# Patient Record
Sex: FEMALE | Race: BLACK OR AFRICAN AMERICAN | NOT HISPANIC OR LATINO | Employment: UNEMPLOYED | URBAN - METROPOLITAN AREA
[De-identification: names, ages, dates, MRNs, and addresses within clinical notes are randomized per-mention and may not be internally consistent; named-entity substitution may affect disease eponyms.]

---

## 2015-10-01 LAB
PAP RECOMMENDATION EXT: NORMAL
PAP SMEAR: NORMAL

## 2016-08-19 LAB — CRC RECOMMENDATION EXT: NORMAL

## 2017-02-14 ENCOUNTER — HISTORICAL (OUTPATIENT)
Dept: HEMATOLOGY/ONCOLOGY | Facility: CLINIC | Age: 52
End: 2017-02-14

## 2017-05-31 ENCOUNTER — HISTORICAL (OUTPATIENT)
Dept: INTERNAL MEDICINE | Facility: CLINIC | Age: 52
End: 2017-05-31

## 2017-05-31 LAB
ALBUMIN SERPL-MCNC: 4.3 GM/DL (ref 3.4–5)
ALBUMIN/GLOB SERPL: 1 RATIO (ref 1–2)
ALP SERPL-CCNC: 52 UNIT/L (ref 20–120)
ALT SERPL-CCNC: 17 UNIT/L
AST SERPL-CCNC: 19 UNIT/L
BILIRUB SERPL-MCNC: 0.4 MG/DL
BILIRUBIN DIRECT+TOT PNL SERPL-MCNC: <0.1 MG/DL
BILIRUBIN DIRECT+TOT PNL SERPL-MCNC: >0.3 MG/DL
BUN SERPL-MCNC: 19 MG/DL (ref 7–25)
CALCIUM SERPL-MCNC: 9.7 MG/DL (ref 8.4–10.3)
CHLORIDE SERPL-SCNC: 102 MMOL/L (ref 96–110)
CHOLEST SERPL-MCNC: 185 MG/DL
CHOLEST/HDLC SERPL: 3.4 {RATIO} (ref 0–4.4)
CO2 SERPL-SCNC: 29 MMOL/L (ref 24–32)
CREAT SERPL-MCNC: 0.78 MG/DL (ref 0.7–1.1)
DEPRECATED CALCIDIOL+CALCIFEROL SERPL-MC: 15.04 NG/ML (ref 30–80)
EST. AVERAGE GLUCOSE BLD GHB EST-MCNC: 114 MG/DL
GLOBULIN SER-MCNC: 3.5 GM/ML (ref 2.3–3.5)
GLUCOSE SERPL-MCNC: 111 MG/DL (ref 65–99)
HBA1C MFR BLD: 5.6 % (ref 4.7–5.6)
HDLC SERPL-MCNC: 54 MG/DL
HIV 1+2 AB+HIV1 P24 AG SERPL QL IA: NONREACTIVE
LDLC SERPL CALC-MCNC: 104 MG/DL (ref 0–130)
POTASSIUM SERPL-SCNC: 4.5 MMOL/L (ref 3.6–5.2)
PROT SERPL-MCNC: 7.8 GM/DL (ref 6–8)
SODIUM SERPL-SCNC: 140 MMOL/L (ref 135–146)
TRIGL SERPL-MCNC: 136 MG/DL
VLDLC SERPL CALC-MCNC: 27 MG/DL

## 2017-07-20 ENCOUNTER — HISTORICAL (OUTPATIENT)
Dept: ADMINISTRATIVE | Facility: HOSPITAL | Age: 52
End: 2017-07-20

## 2017-07-20 LAB
ABS NEUT (OLG): 2.89 X10(3)/MCL (ref 2.1–9.2)
ALBUMIN SERPL-MCNC: 3.8 GM/DL (ref 3.4–5)
ALBUMIN/GLOB SERPL: 1 RATIO (ref 1–2)
ALP SERPL-CCNC: 71 UNIT/L (ref 45–117)
ALT SERPL-CCNC: 24 UNIT/L (ref 12–78)
AST SERPL-CCNC: 21 UNIT/L (ref 15–37)
BASOPHILS # BLD AUTO: 0.04 X10(3)/MCL
BASOPHILS NFR BLD AUTO: 1 % (ref 0–1)
BILIRUB SERPL-MCNC: 0.3 MG/DL (ref 0.2–1)
BILIRUBIN DIRECT+TOT PNL SERPL-MCNC: <0.1 MG/DL
BILIRUBIN DIRECT+TOT PNL SERPL-MCNC: >0.2 MG/DL
BUN SERPL-MCNC: 15 MG/DL (ref 7–18)
CALCIUM SERPL-MCNC: 9.4 MG/DL (ref 8.5–10.1)
CHLORIDE SERPL-SCNC: 103 MMOL/L (ref 98–107)
CO2 SERPL-SCNC: 31 MMOL/L (ref 21–32)
CREAT SERPL-MCNC: 0.9 MG/DL (ref 0.6–1.3)
EOSINOPHIL # BLD AUTO: 0.38 X10(3)/MCL
EOSINOPHIL NFR BLD AUTO: 7 % (ref 0–5)
ERYTHROCYTE [DISTWIDTH] IN BLOOD BY AUTOMATED COUNT: 14.5 % (ref 11.5–14.5)
GLOBULIN SER-MCNC: 4.3 GM/ML (ref 2.3–3.5)
GLUCOSE SERPL-MCNC: 95 MG/DL (ref 74–106)
HCT VFR BLD AUTO: 35.5 % (ref 35–46)
HGB BLD-MCNC: 11.8 GM/DL (ref 12–16)
IMM GRANULOCYTES # BLD AUTO: 0.03 10*3/UL
IMM GRANULOCYTES NFR BLD AUTO: 0 %
LYMPHOCYTES # BLD AUTO: 1.74 X10(3)/MCL
LYMPHOCYTES NFR BLD AUTO: 31 % (ref 15–40)
MCH RBC QN AUTO: 28.2 PG (ref 26–34)
MCHC RBC AUTO-ENTMCNC: 33.2 GM/DL (ref 31–37)
MCV RBC AUTO: 84.7 FL (ref 80–100)
MONOCYTES # BLD AUTO: 0.5 X10(3)/MCL
MONOCYTES NFR BLD AUTO: 9 % (ref 4–12)
NEUTROPHILS # BLD AUTO: 2.89 X10(3)/MCL
NEUTROPHILS NFR BLD AUTO: 52 X10(3)/MCL
PLATELET # BLD AUTO: 323 X10(3)/MCL (ref 130–400)
PMV BLD AUTO: 9.4 FL (ref 7.4–10.4)
POTASSIUM SERPL-SCNC: 3.9 MMOL/L (ref 3.5–5.1)
PROT SERPL-MCNC: 8.1 GM/DL (ref 6.4–8.2)
RBC # BLD AUTO: 4.19 X10(6)/MCL (ref 4–5.2)
SODIUM SERPL-SCNC: 142 MMOL/L (ref 136–145)
WBC # SPEC AUTO: 5.6 X10(3)/MCL (ref 4.5–11)

## 2017-08-24 ENCOUNTER — HISTORICAL (OUTPATIENT)
Dept: HEMATOLOGY/ONCOLOGY | Facility: CLINIC | Age: 52
End: 2017-08-24

## 2017-11-20 ENCOUNTER — HISTORICAL (OUTPATIENT)
Dept: ADMINISTRATIVE | Facility: HOSPITAL | Age: 52
End: 2017-11-20

## 2017-11-20 LAB — FSH SERPL-ACNC: 101.7 MIU/ML

## 2018-04-06 ENCOUNTER — HISTORICAL (OUTPATIENT)
Dept: ADMINISTRATIVE | Facility: HOSPITAL | Age: 53
End: 2018-04-06

## 2018-04-06 LAB
ABS NEUT (OLG): 1.67 X10(3)/MCL (ref 2.1–9.2)
ALBUMIN SERPL-MCNC: 4.1 GM/DL (ref 3.4–5)
ALBUMIN/GLOB SERPL: 1 RATIO (ref 1–2)
ALP SERPL-CCNC: 74 UNIT/L (ref 45–117)
ALT SERPL-CCNC: 15 UNIT/L (ref 12–78)
AST SERPL-CCNC: 12 UNIT/L (ref 15–37)
BASOPHILS # BLD AUTO: 0.06 X10(3)/MCL
BASOPHILS NFR BLD AUTO: 2 %
BILIRUB SERPL-MCNC: 0.3 MG/DL (ref 0.2–1)
BILIRUBIN DIRECT+TOT PNL SERPL-MCNC: <0.1 MG/DL
BILIRUBIN DIRECT+TOT PNL SERPL-MCNC: ABNORMAL MG/DL
BUN SERPL-MCNC: 23 MG/DL (ref 7–18)
CALCIUM SERPL-MCNC: 9.4 MG/DL (ref 8.5–10.1)
CHLORIDE SERPL-SCNC: 107 MMOL/L (ref 98–107)
CO2 SERPL-SCNC: 31 MMOL/L (ref 21–32)
CREAT SERPL-MCNC: 0.8 MG/DL (ref 0.6–1.3)
EOSINOPHIL # BLD AUTO: 0.3 X10(3)/MCL
EOSINOPHIL NFR BLD AUTO: 8 %
ERYTHROCYTE [DISTWIDTH] IN BLOOD BY AUTOMATED COUNT: 14.1 % (ref 11.5–14.5)
GLOBULIN SER-MCNC: 4.3 GM/ML (ref 2.3–3.5)
GLUCOSE SERPL-MCNC: 133 MG/DL (ref 74–106)
HCT VFR BLD AUTO: 38.5 % (ref 35–46)
HGB BLD-MCNC: 12.4 GM/DL (ref 12–16)
IMM GRANULOCYTES # BLD AUTO: 0.02 10*3/UL
IMM GRANULOCYTES NFR BLD AUTO: 0 %
LYMPHOCYTES # BLD AUTO: 1.63 X10(3)/MCL
LYMPHOCYTES NFR BLD AUTO: 41 % (ref 13–40)
MCH RBC QN AUTO: 28.1 PG (ref 26–34)
MCHC RBC AUTO-ENTMCNC: 32.2 GM/DL (ref 31–37)
MCV RBC AUTO: 87.1 FL (ref 80–100)
MONOCYTES # BLD AUTO: 0.3 X10(3)/MCL
MONOCYTES NFR BLD AUTO: 8 % (ref 4–12)
NEUTROPHILS # BLD AUTO: 1.67 X10(3)/MCL
NEUTROPHILS NFR BLD AUTO: 42 X10(3)/MCL
PLATELET # BLD AUTO: 321 X10(3)/MCL (ref 130–400)
PMV BLD AUTO: 9.8 FL (ref 7.4–10.4)
POTASSIUM SERPL-SCNC: 3.8 MMOL/L (ref 3.5–5.1)
PROT SERPL-MCNC: 8.4 GM/DL (ref 6.4–8.2)
RBC # BLD AUTO: 4.42 X10(6)/MCL (ref 4–5.2)
SODIUM SERPL-SCNC: 143 MMOL/L (ref 136–145)
WBC # SPEC AUTO: 4 X10(3)/MCL (ref 4.5–11)

## 2018-08-27 ENCOUNTER — HISTORICAL (OUTPATIENT)
Dept: RADIOLOGY | Facility: HOSPITAL | Age: 53
End: 2018-08-27

## 2019-03-14 ENCOUNTER — HISTORICAL (OUTPATIENT)
Dept: HEMATOLOGY/ONCOLOGY | Facility: CLINIC | Age: 54
End: 2019-03-14

## 2019-04-18 ENCOUNTER — HISTORICAL (OUTPATIENT)
Dept: INTERNAL MEDICINE | Facility: CLINIC | Age: 54
End: 2019-04-18

## 2019-04-18 LAB
ABS NEUT (OLG): 2.66 X10(3)/MCL (ref 2.1–9.2)
ALBUMIN SERPL-MCNC: 4.1 GM/DL (ref 3.4–5)
ALBUMIN/GLOB SERPL: 1 RATIO (ref 1.1–2)
ALP SERPL-CCNC: 72 UNIT/L (ref 45–117)
ALT SERPL-CCNC: 18 UNIT/L (ref 12–78)
AST SERPL-CCNC: 12 UNIT/L (ref 15–37)
BASOPHILS # BLD AUTO: 0.04 X10(3)/MCL
BASOPHILS NFR BLD AUTO: 1 %
BILIRUB SERPL-MCNC: 0.2 MG/DL (ref 0.2–1)
BILIRUBIN DIRECT+TOT PNL SERPL-MCNC: <0.1 MG/DL
BILIRUBIN DIRECT+TOT PNL SERPL-MCNC: >0.1 MG/DL
BUN SERPL-MCNC: 22 MG/DL (ref 7–18)
CALCIUM SERPL-MCNC: 9.9 MG/DL (ref 8.5–10.1)
CHLORIDE SERPL-SCNC: 105 MMOL/L (ref 98–107)
CHOLEST SERPL-MCNC: 192 MG/DL
CHOLEST/HDLC SERPL: 2.7 {RATIO} (ref 0–4.4)
CO2 SERPL-SCNC: 32 MMOL/L (ref 21–32)
CREAT SERPL-MCNC: 0.8 MG/DL (ref 0.6–1.3)
EOSINOPHIL # BLD AUTO: 0.27 X10(3)/MCL
EOSINOPHIL NFR BLD AUTO: 6 %
ERYTHROCYTE [DISTWIDTH] IN BLOOD BY AUTOMATED COUNT: 14 % (ref 11.5–14.5)
EST. AVERAGE GLUCOSE BLD GHB EST-MCNC: 120 MG/DL
GLOBULIN SER-MCNC: 4.2 GM/ML (ref 2.3–3.5)
GLUCOSE SERPL-MCNC: 101 MG/DL (ref 74–106)
HBA1C MFR BLD: 5.8 % (ref 4.2–6.3)
HCT VFR BLD AUTO: 38.8 % (ref 35–46)
HDLC SERPL-MCNC: 72 MG/DL
HGB BLD-MCNC: 12.8 GM/DL (ref 12–16)
IMM GRANULOCYTES # BLD AUTO: 0.02 10*3/UL
IMM GRANULOCYTES NFR BLD AUTO: 0 %
LDLC SERPL CALC-MCNC: 108 MG/DL (ref 0–130)
LYMPHOCYTES # BLD AUTO: 1.47 X10(3)/MCL
LYMPHOCYTES NFR BLD AUTO: 30 % (ref 13–40)
MCH RBC QN AUTO: 28.9 PG (ref 26–34)
MCHC RBC AUTO-ENTMCNC: 33 GM/DL (ref 31–37)
MCV RBC AUTO: 87.6 FL (ref 80–100)
MONOCYTES # BLD AUTO: 0.39 X10(3)/MCL
MONOCYTES NFR BLD AUTO: 8 % (ref 4–12)
NEUTROPHILS # BLD AUTO: 2.66 X10(3)/MCL
NEUTROPHILS NFR BLD AUTO: 55 X10(3)/MCL
PLATELET # BLD AUTO: 331 X10(3)/MCL (ref 130–400)
PMV BLD AUTO: 9.4 FL (ref 7.4–10.4)
POTASSIUM SERPL-SCNC: 4.2 MMOL/L (ref 3.5–5.1)
PROT SERPL-MCNC: 8.3 GM/DL (ref 6.4–8.2)
RBC # BLD AUTO: 4.43 X10(6)/MCL (ref 4–5.2)
SODIUM SERPL-SCNC: 140 MMOL/L (ref 136–145)
TRIGL SERPL-MCNC: 62 MG/DL
VLDLC SERPL CALC-MCNC: 12 MG/DL
WBC # SPEC AUTO: 4.8 X10(3)/MCL (ref 4.5–11)

## 2019-10-14 ENCOUNTER — HISTORICAL (OUTPATIENT)
Dept: RADIOLOGY | Facility: HOSPITAL | Age: 54
End: 2019-10-14

## 2021-04-13 ENCOUNTER — HISTORICAL (OUTPATIENT)
Dept: RADIOLOGY | Facility: HOSPITAL | Age: 56
End: 2021-04-13

## 2021-04-20 ENCOUNTER — HISTORICAL (OUTPATIENT)
Dept: RADIOLOGY | Facility: HOSPITAL | Age: 56
End: 2021-04-20

## 2021-06-16 ENCOUNTER — HISTORICAL (OUTPATIENT)
Dept: INTERNAL MEDICINE | Facility: CLINIC | Age: 56
End: 2021-06-16

## 2021-06-16 LAB
ABS NEUT (OLG): 2.05 X10(3)/MCL (ref 2.1–9.2)
ALBUMIN SERPL-MCNC: 4.4 GM/DL (ref 3.5–5)
ALBUMIN/GLOB SERPL: 1.3 RATIO (ref 1.1–2)
ALP SERPL-CCNC: 75 UNIT/L (ref 40–150)
ALT SERPL-CCNC: 19 UNIT/L (ref 0–55)
AST SERPL-CCNC: 22 UNIT/L (ref 5–34)
BASOPHILS # BLD AUTO: 0 X10(3)/MCL (ref 0–0.2)
BASOPHILS NFR BLD AUTO: 1 %
BILIRUB SERPL-MCNC: 0.3 MG/DL
BILIRUBIN DIRECT+TOT PNL SERPL-MCNC: 0.1 MG/DL (ref 0–0.5)
BILIRUBIN DIRECT+TOT PNL SERPL-MCNC: 0.2 MG/DL (ref 0–0.8)
BUN SERPL-MCNC: 25.2 MG/DL (ref 9.8–20.1)
CALCIUM SERPL-MCNC: 10 MG/DL (ref 8.4–10.2)
CHLORIDE SERPL-SCNC: 107 MMOL/L (ref 98–107)
CHOLEST SERPL-MCNC: 193 MG/DL
CHOLEST/HDLC SERPL: 4 {RATIO} (ref 0–5)
CO2 SERPL-SCNC: 31 MMOL/L (ref 22–29)
CREAT SERPL-MCNC: 0.81 MG/DL (ref 0.55–1.02)
DEPRECATED CALCIDIOL+CALCIFEROL SERPL-MC: 34.8 NG/ML (ref 30–80)
EOSINOPHIL # BLD AUTO: 0.3 X10(3)/MCL (ref 0–0.9)
EOSINOPHIL NFR BLD AUTO: 6 %
ERYTHROCYTE [DISTWIDTH] IN BLOOD BY AUTOMATED COUNT: 14.4 % (ref 11.5–14.5)
EST. AVERAGE GLUCOSE BLD GHB EST-MCNC: 116.9 MG/DL
GLOBULIN SER-MCNC: 3.5 GM/DL (ref 2.4–3.5)
GLUCOSE SERPL-MCNC: 97 MG/DL (ref 74–100)
HBA1C MFR BLD: 5.7 %
HCT VFR BLD AUTO: 38.9 % (ref 35–46)
HDLC SERPL-MCNC: 55 MG/DL (ref 35–60)
HGB BLD-MCNC: 12.4 GM/DL (ref 12–16)
IMM GRANULOCYTES # BLD AUTO: 0.01 10*3/UL
IMM GRANULOCYTES NFR BLD AUTO: 0 %
LDLC SERPL CALC-MCNC: 121 MG/DL (ref 50–140)
LYMPHOCYTES # BLD AUTO: 1.7 X10(3)/MCL (ref 0.6–4.6)
LYMPHOCYTES NFR BLD AUTO: 38 %
MCH RBC QN AUTO: 28.3 PG (ref 26–34)
MCHC RBC AUTO-ENTMCNC: 31.9 GM/DL (ref 31–37)
MCV RBC AUTO: 88.8 FL (ref 80–100)
MONOCYTES # BLD AUTO: 0.4 X10(3)/MCL (ref 0.1–1.3)
MONOCYTES NFR BLD AUTO: 8 %
NEUTROPHILS # BLD AUTO: 2.05 X10(3)/MCL (ref 2.1–9.2)
NEUTROPHILS NFR BLD AUTO: 46 %
NRBC BLD AUTO-RTO: 0 % (ref 0–0.2)
PLATELET # BLD AUTO: 327 X10(3)/MCL (ref 130–400)
PMV BLD AUTO: 9.7 FL (ref 7.4–10.4)
POTASSIUM SERPL-SCNC: 4.6 MMOL/L (ref 3.5–5.1)
PROT SERPL-MCNC: 7.9 GM/DL (ref 6.4–8.3)
RBC # BLD AUTO: 4.38 X10(6)/MCL (ref 4–5.2)
SODIUM SERPL-SCNC: 145 MMOL/L (ref 136–145)
TRIGL SERPL-MCNC: 84 MG/DL (ref 37–140)
VLDLC SERPL CALC-MCNC: 17 MG/DL
WBC # SPEC AUTO: 4.5 X10(3)/MCL (ref 4.5–11)

## 2022-04-10 ENCOUNTER — HISTORICAL (OUTPATIENT)
Dept: ADMINISTRATIVE | Facility: HOSPITAL | Age: 57
End: 2022-04-10
Payer: MEDICAID

## 2022-04-22 ENCOUNTER — HISTORICAL (OUTPATIENT)
Dept: ADMINISTRATIVE | Facility: HOSPITAL | Age: 57
End: 2022-04-22
Payer: MEDICAID

## 2022-04-22 ENCOUNTER — HISTORICAL (OUTPATIENT)
Dept: RADIOLOGY | Facility: HOSPITAL | Age: 57
End: 2022-04-22
Payer: MEDICAID

## 2022-04-26 VITALS
WEIGHT: 158.94 LBS | HEIGHT: 64 IN | DIASTOLIC BLOOD PRESSURE: 88 MMHG | BODY MASS INDEX: 27.13 KG/M2 | SYSTOLIC BLOOD PRESSURE: 138 MMHG

## 2022-05-02 NOTE — PROGRESS NOTES
"[facility] INTERNAL MEDICINE  OUTPATIENT OFFICE VISIT NOTE    SUBJECTIVE:    HPI: Lucille Pabon is a 56 y.o. yo female w/ PMH of HTN, and left breast carcinoma (diagnosed 1/20/2016) s/p mastectomy, who presents today for regular F/U. She states having mild right knee pain at this time with a pain of 3 out of 10; it is aggravated by physical activities and relieved by rest; she doesn't want to take any pain medicine at this time. Other than the mild right knee pain, she feels well overall. Denies chest pain, palpitations, SOB, fever, night sweats, chills, diarrhea, constipation.    ROS:  10 point review of systems assessed and are negative except as stated above    OBJECTIVE:    Vitals:   BP (!) 149/87   Pulse 79   Temp 98.1 °F (36.7 °C) (Oral)   Resp 20   Ht 5' 4" (1.626 m)   Wt 73.5 kg (162 lb 0.6 oz)   BMI 27.81 kg/m²     Physical Exam:  General: Well nourished w/o distress  HEENT: NC/AT; PERRLA; nasal and oral mucosa moist and clear; no sinus tenderness; no thyromegaly  Neck: Full ROM; no lymphadenopathy  Pulm: CTA bilaterally, normal work of breathing  CV: S1, S2 w/o murmurs or gallops; no edema noted  GI: Soft with normal bowel sounds in all quadrants, no masses on palpation  MSK: Full ROM of all extremities and spine w/o limitation or discomfort  Derm: No rashes, abnormal bruising, or skin lesions  Neuro: AAOx4; motor/sensory function intact  Psych: Cooperative; appropriate mood and affect    Significant findings:  4/13/2021 - Right Breast: Negative (BI-RADS 1); Left Breast: Benign (BI-RADS 2)   4/20/2021 - DXA Bone Density test shows measured bone mineral density remains within normal limits  4/22/2022 - Mammogram shows Right Breast: Negative (BI-RADS 1); Left Breast: Benign (BI-RADS 2)     ASSESSMENT & PLAN:    HX of left breast carcinoma (ER+, HER2+)  -S/P mastectomy w/ sentinel node BX on 1/11/2016 showing 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  -Completed Tamoxifen " therapy 2/17/2017  -Currently being seen by [facility]; last appointment was 4/29/2022   -Continue Arimidex daily per [facility]'s recommendation  -Mammogram 4/22/2022 shows Right Breast: Negative (BI-RADS 1); Left Breast: Benign (BI-RADS 2)   -Dexa scan 3/14/2019 showed normal bone density  -Continue regular surveillance per Oncology's recommendation     HTN   -Initial /87; recheck is 138/82  -Per patient, at home BP range is 110/70 to 125/80   -Instructed patient keep a BP log and bring it to next F/U visit  -Continue Lisinopril and Norvasc     Health Maintenance:  -Last colonoscopy 8/19/2016 WNL; repeat q10 years  -Tdap (2/16/2017)  -Prevnar (2/20/2019)  -COVID vaccine #1 (3/22/2021); #2 (4/20/2021); #3 (11/12/2021)  -Shingrix #1 (5/3/2022)  -Mammogram 4/26/2022  -Referral to [facility] clinic made today  -Cologuard ordered today    Return to clinic in 4 months. F/U on Shingrix #2 and BP (will consider adjusting BP meds if BP continues to be elevated).    Joe Marti, DO   [facility] Internal Medicine, PGY-1

## 2022-05-02 NOTE — PROGRESS NOTES
[facility] INTERNAL MEDICINE  OUTPATIENT OFFICE VISIT NOTE    SUBJECTIVE:    HPI: Lucille Pabon is a 56 y.o. yo female w/ PMH of HTN, and left breast carcinoma (diagnosed 1/20/2016) s/p mastectomy, who presents today for regular F/U. She states ***. Denies chest pain, palpitations, SOB, fever, night sweats, chills, diarrhea, constipation.    ROS:  10 point review of systems assessed and are negative except as stated above    OBJECTIVE:    Vitals:   There were no vitals taken for this visit.     Physical Exam:  General: Well nourished w/o distress  HEENT: NC/AT; PERRLA; nasal and oral mucosa moist and clear; no sinus tenderness; no thyromegaly  Neck: Full ROM; no lymphadenopathy  Pulm: CTA bilaterally, normal work of breathing  CV: S1, S2 w/o murmurs or gallops; no edema noted  GI: Soft with normal bowel sounds in all quadrants, no masses on palpation  MSK: Full ROM of all extremities and spine w/o limitation or discomfort  Derm: No rashes, abnormal bruising, or skin lesions  Neuro: AAOx4; CN II-XII intact; motor/sensory function intact  Psych: Cooperative; appropriate mood and affect    Significant findings:  4/13/2021 - Right Breast: Negative (BI-RADS 1); Left Breast: Benign (BI-RADS 2)   4/20/2021 - DXA Bone Density test shows measured bone mineral density remains within normal limits    ASSESSMENT & PLAN:    HX of left breast carcinoma (ER+, HER2+)  -S/P mastectomy w/ sentinel node BX on 1/11/2016 showing 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  -Completed Tamoxifen therapy 2/17/2017  -Currently being seen by [facility]   -Continue Arimidex daily per [facility]'s recommendation  -Mammogram 4/20/2021 showed benign findings on left breast  -Dexa scan 3/14/2019 showed normal bone density  -Continue regular surveillance per Oncology's recommendation     HTN   -Normotensive at this time   -Continue Lisinopril and Norvasc     Health Maintenance:  -Last colonoscopy 8/19/2016 WNL; repeat  q10 years  -Tdap (2/16/2017)  -Prevnar (2/20/2019); Pneumovax (***)  -COVID vaccine   -Shingrix   -Due for mammogram   -Currently being seen by [facility] clinic for GYN needs     Return to clinic in *** months. F/U on ***.     Joe Marti,    [facility] Internal Medicine, PGY-1

## 2022-05-03 ENCOUNTER — OFFICE VISIT (OUTPATIENT)
Dept: INTERNAL MEDICINE | Facility: CLINIC | Age: 57
End: 2022-05-03
Payer: MEDICAID

## 2022-05-03 VITALS
WEIGHT: 162.06 LBS | TEMPERATURE: 98 F | SYSTOLIC BLOOD PRESSURE: 138 MMHG | HEIGHT: 64 IN | HEART RATE: 79 BPM | DIASTOLIC BLOOD PRESSURE: 82 MMHG | BODY MASS INDEX: 27.67 KG/M2 | RESPIRATION RATE: 20 BRPM

## 2022-05-03 DIAGNOSIS — Z85.3 HISTORY OF LEFT BREAST CANCER: ICD-10-CM

## 2022-05-03 DIAGNOSIS — Z79.899 MEDICATION MANAGEMENT: ICD-10-CM

## 2022-05-03 DIAGNOSIS — Z00.00 HEALTH MAINTENANCE EXAMINATION: Primary | ICD-10-CM

## 2022-05-03 DIAGNOSIS — I10 HYPERTENSION, UNSPECIFIED TYPE: ICD-10-CM

## 2022-05-03 PROCEDURE — 90750 HZV VACC RECOMBINANT IM: CPT | Mod: PBBFAC

## 2022-05-03 PROCEDURE — 99214 OFFICE O/P EST MOD 30 MIN: CPT | Mod: PBBFAC

## 2022-05-03 RX ORDER — FLUTICASONE PROPIONATE 50 MCG
SPRAY, SUSPENSION (ML) NASAL
COMMUNITY
Start: 2021-05-19 | End: 2022-09-08

## 2022-05-03 RX ORDER — LISINOPRIL 20 MG/1
20 TABLET ORAL DAILY
COMMUNITY
Start: 2021-05-19 | End: 2023-03-08 | Stop reason: SDUPTHER

## 2022-05-03 RX ORDER — LEVOCETIRIZINE DIHYDROCHLORIDE 5 MG/1
5 TABLET, FILM COATED ORAL
COMMUNITY
Start: 2021-05-19 | End: 2022-09-08

## 2022-05-03 RX ORDER — MUPIROCIN 20 MG/G
OINTMENT TOPICAL
COMMUNITY
Start: 2021-10-06 | End: 2022-09-08

## 2022-05-03 RX ORDER — ASPIRIN 81 MG/1
81 TABLET ORAL DAILY
COMMUNITY
Start: 2021-05-19 | End: 2022-09-08 | Stop reason: SDUPTHER

## 2022-05-03 RX ORDER — AMLODIPINE BESYLATE 5 MG/1
5 TABLET ORAL DAILY
COMMUNITY
Start: 2021-05-19 | End: 2022-09-27 | Stop reason: SDUPTHER

## 2022-05-03 NOTE — HISTORICAL [facility] CERNER
This is a historical note converted from Eliseo. Formatting and pictures may have been removed.  Please reference Eliseo for original formatting and attached multimedia. Chief Complaint  Breast Cancer ECOG=0  History of Present Illness  Lucille Pabon?is?a?53 Years?old?Female?with?left breast cancer?who is here for follow up.  ?   ??Problem List: ?  ??Stage IA T1N0M0 Left Breast Carcinoma - ER 99%+, IA 10%+ and Her2 negative  ??- 1.3 cm tubular primary, T1c N0  ??Diagnosed 11/19/15.?  ???  ??Current Treatment:  ???Arimidex started 2-7-17  ???  ??Treatment History:  ??s/p mastectomy w/ SLNBX x4 on 1/11/16. 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  ??Tamoxifen 20 mg po daily. ?2/4/16-2/7/17  ???  ??Clinical History/HPI:  ??This is a 51-year-old -American female who underwent a routine mammogram that revealed a mass in the left breast. Further workup with an ultrasound confirmed an irregular hypoechoic mass in the upper outer quadrant, suspicious for malignancy subsequent biopsy proved grade 1 tubular carcinoma with focal DCIS identified. Options were discussed with the patient, who agreed to undergo a left breast mastectomy with sentinel lymph node biopsy on January 11, 2016. Results from that procedure showed well-differentiated tubular carcinoma, measuring 1.3 cm. 13 sentinel lymph nodes were examined, all negative. Percent presented in tumor Board on January 26, 2016. ?Tumor Board plan was to give Arimidex versus tamoxifen for hormonal therapy. She is 99% ER positive, 10% IA positive and HER-2/alecia negative, with a Ki-67 index of 2%.  ???  ??1/11/16: mastectomy w/SLNBX x4  ??- Patient has tubular histology, which is lower risk than standard invasive ductal carcinoma.  ??2/4/16-2/7/17:?Took tamoxifen  ??2/7/17: switched her to Arimidex?  ??2/14/17: Bone density shows normal bone mineralization  ??8/27/18: Mammogram showed?BIRADS Category 2: Benign. No  mammographic evidence of malignancy  ???Interval History?  ?????18:?(Tania Alexander NP)?Scheduled 4 month follow up for low risk breast cancer diagnosed in . She is doing well, currently on arimidex with no intolerable side effects. Last screening mammogram of her right breast was negative. She has normal bone density on last DEXA 16. She has hot flashes, not worsening.?She says for the last couple of months, she has been having some insomnia.??  ?? 3/14/19:? Ms. Pabon reports that she is doing well. She has brought in her BP recording for the last 4-6 weeks. She is averaging 160/110. She has occasional headaches rickey when her BP is higher. She denies having had the worst headache of her life. She is on Lisinopril 20mg and Norvasc 5mg.  She denies any changes to her chest wall, no masses or skin changes. She continues on Arimidex. She has occasional hot flashes. She had gained weight, but is now working on loosing it. She and her partner had been walking daily, and plan to get to do this.? The felt much better when they did.  Review of Systems  As noted in the interval history, otherwise a complete 12 point review of systems is negative in detail.?  Physical Exam  Vitals & Measurements  T:?36.9? ?C (Oral)? HR:?72(Peripheral)? RR:?18? BP:?153/105? SpO2:?100%?  HT:?163?cm? WT:?72.1?kg? BMI:?27.14?  PHYSICAL EXAM:  ECO  KARNOFSKY:100  GENERAL APPEARANCE: Well developed, well nourished in no acute distress.  HEENT: Normocephalic, atraumatic. no scleral icterus. PERRL. extraocular movements intact. Oral mucous membranes are moist, without exudate, erythema, edema or lesions. Nasal passages are clear without edema or erythema.  NECK: Supple, non-tender without masses or thyromegaly.  NODES: No lymphadenopathy.  LUNGS: Clear to auscultation through out. No wheezes, rales, rhonchi or crackles heard.  CARDIAC: Regular rate and rhythm, without murmurs or rubs.  ABDOMEN: Soft, nontender, and not distended.  Positive bowel tones through out. No hepatosplenomegaly.? No masses or ascites noted.  EXTREMITIES:Warm without clubbing edema, or cyanosis. No joint erythema or tenderness. Normal gait.  BACK: No pain to palpation. No flank pain.  SKIN: No rashes, ulcerations, or bruising.  NEUROLOGIC: Cranial nerves II - XII grossly intact.  PSYCH: Normal affect, no signs of depression, anxiety. AAOx3  Assessment/Plan  1.?Primary cancer of left breast?C50.912  ??1. Stage IA [id number] Left Breast Carcinoma - ER 99%+, MO 10%+ and Her2 -ve. Diagnosed 11/2015???  1/11/16: mastectomy w/SLNBX x4???  - Patient has tubular histology, which is lower risk than standard invasive ductal carcinoma.???  2/4/16-2/7/17:?Took tamoxifen???  2/7/17: switched her to Arimidex????  2/14/17: Bone density shows normal bone mineralization  8/27/18: Mammogram showed?BIRADS Category 2: Benign. No mammographic evidence of malignancy?  3/14/19: Bone density shows normal bone mineralization  ?   ??  Ordered:  Clinic Follow up, 09/19/19 13:30:00 CDT, Primary cancer of left breast, [facility]  Clinic Follow up, *Est. 09/14/19 3:00:00 CDT, Order for future visit, Primary cancer of left breast, [facility]  Office Visit Level 4 Est 44891, Primary cancer of left breast, 03/14/19 11:18:00 CDT  ?  2.?Hypertension?I10  - Continue Lisinopril 20mg  - Increased her Norvasc to 10mg, currently at 5mg. She will take 2 tablets daily.  - I reviewed sxs of hypotension. I instructed her that if she develops the worst headache ever to seek medical attention.  - Follow up with PCP for further treatment and evaluation of HTN, we will call to schedule this.  ?  PLAN?  -Continue Arimidex 1 mg po daily for a total of 5 years, i.e., until February 2021. ?Considering early stage low risk disease, 5 years of therapy is likely adequate  ?- Repeat right breast mammogram 8/2020  ?- Continue calcium + vitamin D supplements to prevent bone  demineralization as side effect of aromatase inhibitor therapy.  -DEXA scan?every 2 years,last done today 3/14/19, she is on an AI, so increased risk of osteoporosis.  - Increase Norvasc to 10mg po qd.  ??Follow-up visit in 6 months, labs by PCP   Problem List/Past Medical History  Ongoing  Anemia  History of left breast cancer  Hypertension  Historical  Anemia  HTN (hypertension)  Procedure/Surgical History  Biopsy Sentinal Node (Left) (01/11/2016)  Mastectomy Simple (Left) (01/11/2016)  Tubal ligation (11/19/1988)   Medications  Arimidex 1 mg oral tablet, 1 mg= 1 tab(s), Oral, Daily, 3 refills  lisinopril 20 mg oral tablet, See Instructions, 5 refills  Norvasc 5 mg oral tablet, 5 mg= 1 tab(s), Oral, Daily, 6 refills  Allergies  No Known Medication Allergies  Social History  Alcohol  Never, 07/01/2015  Employment/School  Employed, Work/School description: Bus aid. Highest education level: High school., 02/16/2016  Exercise  Exercise frequency: Daily. Self assessment: Good condition. Exercise type: Walking, BIKE., 05/24/2016  Home/Environment  Lives with Children. Living situation: Home/Independent. Alcohol abuse in household: No. Substance abuse in household: No. Smoker in household: No. Injuries/Abuse/Neglect in household: No. Feels unsafe at home: No. Safe place to go: Yes. Family/Friends available for support: Yes., 07/22/2015  Nutrition/Health  Type of diet: low sodium., 04/23/2018  Regular, 07/22/2015  Substance Abuse  Never, 02/16/2016  Tobacco  Never (less than 100 in lifetime), N/A, Ready to change: No., 03/14/2019  Family History  Aneurysm: Father.  Congestive heart disease.: Mother.  Hypertension.: Mother.  Immunizations  Vaccine Date Status Comments   pneumococcal 13-valent conjugate vaccine 02/20/2019 Given    influenza virus vaccine, inactivated 01/09/2019 Recorded [employer]   influenza virus vaccine, inactivated 11/20/2017 Given    influenza virus vaccine, inactivated 11/16/2016 Given    influenza virus  vaccine, inactivated 02/16/2016 Given TOLERATED WELL   tetanus/diphtheria/pertussis, acel(Tdap) 02/16/2016 Given TOLERARTED WELL   Health Maintenance  Health Maintenance  ???Pending?(in the next year)  ??? ??OverDue  ??? ? ? ?Diabetes Screening due??and every?  ??? ? ? ?Hypertension Management-Education due??05/24/17??and every 1??year(s)  ??? ? ? ?Cervical Cancer Screening due??09/30/18??and every 3??year(s)  ??? ? ? ?Alcohol Misuse Screening due??01/04/19??and every 1??year(s)  ??? ??Due In Future?  ??? ? ? ?Hypertension Management-BMP not due until??04/06/19??and every 1??year(s)  ??? ? ? ?ADL Screening not due until??02/20/20??and every 1??year(s)  ??? ? ? ?Blood Pressure Screening not due until??03/13/20??and every 1??year(s)  ??? ? ? ?Body Mass Index Check not due until??03/13/20??and every 1??year(s)  ??? ? ? ?Depression Screening not due until??03/13/20??and every 1??year(s)  ??? ? ? ?Hypertension Management-Blood Pressure not due until??03/13/20??and every 1??year(s)  ??? ? ? ?Obesity Screening not due until??03/14/20??and every 1??year(s)  ???Satisfied?(in the past 1 year)  ??? ??Satisfied?  ??? ? ? ?ADL Screening on??02/20/19.??Satisfied by Alley Lopez LPN  ??? ? ? ?Blood Pressure Screening on??03/14/19.??Satisfied by Savi James  ??? ? ? ?Body Mass Index Check on??03/14/19.??Satisfied by Savi James  ??? ? ? ?Breast Cancer Screening on??08/27/18.??Satisfied by Seble Bowen  ??? ? ? ?Depression Screening on??03/14/19.??Satisfied by Savi James  ??? ? ? ?Diabetes Screening on??04/06/18.??Satisfied by Tere Jmienez  ??? ? ? ?Hypertension Management-Blood Pressure on??03/14/19.??Satisfied by Savi James  ??? ? ? ?Influenza Vaccine on??01/09/19.??Satisfied by Savi James  ??? ? ? ?Obesity Screening on??03/14/19.??Satisfied by Savi James  ?  ?  Diagnostic Results  (03/14/2019 09:53 CDT XR Bone Density Complete)  Reason For Exam  Encounter for general adult  medical examination without abnormal findings;Screening  ?  Radiology Report  Findings: Standard bone mineral density evaluation was performed in  the vertebral bodies of the lumbar spine and femoral necks  bilaterally. This study is compared to 2/14/2017.  ?  The average T score of the femoral necks is 1.0 and within normal  range. The average T score in 2017 was 1.1.  ?  The average T score of the vertebral bodies of the lumbar spine is 1.1  and within normal range. The average T score in 2017 was 1.8.  ?  IMPRESSION: Normal bone density.  ?  ?  Signature Line  Electronically Signed By: Darren Johnson MD  Date/Time Signed: 03/14/2019 10:08  ?  ?   ? [1] (08/27/2018 07:31 CDT MG Screening)  Reason For Exam  H/O BREAST CA;Hx Breast Cancer  ?  Radiology Report  MG Screening  ?  HISTORY: Screening; prior left breast cancer  ?  PROCEDURE: Right breast digital screening mammography was performed.  Computer aided detection was utilized for interpretation.  ?  COMPARISON: Mammography from 8/24/2017, 8/24/2016 and 8/14/2015  ?  FINDINGS: The breasts are composed of scattered fibroglandular  densities. ?  ?  No concerning clustered microcalcifications, suspicious mass, or areas  of architectural distortion are identified in either breast. Few  benign-appearing calcifications.  ?  IMPRESSION: BIRADS Category 2: Benign. No mammographic evidence of  malignancy.  ?  RECOMMENDATION: Follow-up mammography in one year.  ?  ?  ?  ?  ?  ?  Signature Line  BI-RADS: 2-Benign finding  Recommendation: Normal interval follow-up  ?  Electronically Signed By: Pedro Garcia MD  Date/Time Signed: 08/27/2018 09:25  ? [2]     [1]?XR Bone Density Complete; Darren Johnson MD 03/14/2019 09:53 CDT  [2]?MG Screening; Pedro Garcia MD 08/27/2018 07:31 CDT

## 2022-05-04 NOTE — PROGRESS NOTES
Attending Addendum:   Patient seen and examined in clinic. Management and Plan were discussed with resident. Care was reasonable and necessary. Patient blood pressure is elevated today, will need to monitor at home and bring blood pressure log at next visit. Bring blood pressure monitor to next visit, so it can be compared to our blood pressure readings. If blood pressure is elevated at next visit, will need to adjust medications.   Elvie Heard MD  [facility]

## 2022-06-13 DIAGNOSIS — Z12.11 SCREENING FOR COLORECTAL CANCER: Primary | ICD-10-CM

## 2022-06-13 DIAGNOSIS — Z12.12 SCREENING FOR COLORECTAL CANCER: Primary | ICD-10-CM

## 2022-07-01 LAB — NONINV COLON CA DNA+OCC BLD SCRN STL QL: NEGATIVE

## 2022-09-07 NOTE — PROGRESS NOTES
"[facility] INTERNAL MEDICINE  OUTPATIENT OFFICE VISIT NOTE    SUBJECTIVE:    HPI: Lucille Pabon is a 56 y.o. yo female w/ PMH of HTN, and left breast carcinoma (diagnosed 1/20/2016) s/p mastectomy, who presents today for regular F/U. She states feeling well and has been walking for at least 30 minutes daily to lose weight. No complaints at this time.     ROS:  (-) Chest pain, palpitations, fever, night sweat, diarrhea, constipation    OBJECTIVE:    Vitals:   BP (!) 140/82 (BP Location: Left arm, Patient Position: Sitting, BP Method: Large (Manual))   Pulse 80   Temp 98.1 °F (36.7 °C) (Oral)   Resp 18   Ht 5' 4" (1.626 m)   Wt 75.7 kg (166 lb 12.8 oz)   BMI 28.63 kg/m²     Physical Exam:  General: Well nourished w/o distress  HEENT: NC/AT; PERRL; nasal and oral mucosa moist and clear  Neck: Full ROM; no lymphadenopathy  Pulm: CTA bilaterally, normal work of breathing  CV: S1, S2 w/o murmurs or gallops; no edema noted  GI: Soft with normal bowel sounds in all quadrants, no masses on palpation  MSK: Full ROM of all extremities and spine w/o limitation or discomfort  Derm: No rashes, abnormal bruising, or skin lesions  Neuro: AAOx4; motor/sensory function intact  Psych: Cooperative; appropriate mood and affect    Significant findings:  4/13/2021 - Right Breast: Negative (BI-RADS 1); Left Breast: Benign (BI-RADS 2)   4/20/2021 - DXA Bone Density test shows measured bone mineral density remains within normal limits  4/22/2022 - Mammogram shows Right Breast: Negative (BI-RADS 1); Left Breast: Benign (BI-RADS 2)     ASSESSMENT & PLAN:    HTN   -Slightly hypertensive at this time  -Patient states at home SBP range is 120-130  -Continue Lisinopril and Norvasc     HX of left breast carcinoma (ER+, HER2+)  -S/P mastectomy w/ sentinel node BX on 1/11/2016 showing 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  -Completed Tamoxifen therapy 2/17/2017  -Currently being seen by [facility]; next " appointment is 4/27/2023   -Mammogram 4/22/2022 shows Right Breast: Negative (BI-RADS 1); Left Breast: Benign (BI-RADS 2)   -Dexa scan 3/14/2019 showed normal bone density  -Continue regular surveillance per Oncology's recommendation     Health Maintenance:  -Last colonoscopy 8/19/2016 WNL; repeat q10 years  -Tdap (2/16/2017)  -Prevnar (2/20/2019)  -Flu shot (9/8/2022)  -COVID vaccine #1 (3/22/2021); #2 (4/20/2021); #3 (11/12/2021)  -Shingrix #1 (5/3/2022) #2 (9/8/2022)  -Mammogram 4/22/2022 negative  -Will be seen by [facility] on 12/13/2022  -Cologuard negative on 6/23/2022    Return to clinic in 5 months. F/U CMP, CBC, lipid panel.    Joe Marti DO   [facility], PGY-1

## 2022-09-08 ENCOUNTER — LAB VISIT (OUTPATIENT)
Dept: LAB | Facility: HOSPITAL | Age: 57
End: 2022-09-08
Attending: STUDENT IN AN ORGANIZED HEALTH CARE EDUCATION/TRAINING PROGRAM
Payer: MEDICAID

## 2022-09-08 ENCOUNTER — OFFICE VISIT (OUTPATIENT)
Dept: INTERNAL MEDICINE | Facility: CLINIC | Age: 57
End: 2022-09-08
Payer: MEDICAID

## 2022-09-08 ENCOUNTER — TELEPHONE (OUTPATIENT)
Dept: INTERNAL MEDICINE | Facility: CLINIC | Age: 57
End: 2022-09-08

## 2022-09-08 VITALS
BODY MASS INDEX: 28.48 KG/M2 | TEMPERATURE: 98 F | HEART RATE: 80 BPM | WEIGHT: 166.81 LBS | HEIGHT: 64 IN | SYSTOLIC BLOOD PRESSURE: 140 MMHG | DIASTOLIC BLOOD PRESSURE: 82 MMHG | RESPIRATION RATE: 18 BRPM

## 2022-09-08 DIAGNOSIS — Z00.00 HEALTHCARE MAINTENANCE: ICD-10-CM

## 2022-09-08 DIAGNOSIS — I10 HYPERTENSION, UNSPECIFIED TYPE: ICD-10-CM

## 2022-09-08 DIAGNOSIS — Z85.3 HISTORY OF LEFT BREAST CANCER: ICD-10-CM

## 2022-09-08 DIAGNOSIS — I10 HYPERTENSION, UNSPECIFIED TYPE: Primary | ICD-10-CM

## 2022-09-08 LAB
ALBUMIN SERPL-MCNC: 4.4 GM/DL (ref 3.5–5)
ALBUMIN/GLOB SERPL: 1.3 RATIO (ref 1.1–2)
ALP SERPL-CCNC: 65 UNIT/L (ref 40–150)
ALT SERPL-CCNC: 13 UNIT/L (ref 0–55)
AST SERPL-CCNC: 19 UNIT/L (ref 5–34)
BASOPHILS # BLD AUTO: 0.05 X10(3)/MCL (ref 0–0.2)
BASOPHILS NFR BLD AUTO: 1.1 %
BILIRUBIN DIRECT+TOT PNL SERPL-MCNC: 0.4 MG/DL
BUN SERPL-MCNC: 15.8 MG/DL (ref 9.8–20.1)
CALCIUM SERPL-MCNC: 10.2 MG/DL (ref 8.4–10.2)
CHLORIDE SERPL-SCNC: 102 MMOL/L (ref 98–107)
CHOLEST SERPL-MCNC: 226 MG/DL
CHOLEST/HDLC SERPL: 3 {RATIO} (ref 0–5)
CO2 SERPL-SCNC: 32 MMOL/L (ref 22–29)
CREAT SERPL-MCNC: 0.79 MG/DL (ref 0.55–1.02)
EOSINOPHIL # BLD AUTO: 0.23 X10(3)/MCL (ref 0–0.9)
EOSINOPHIL NFR BLD AUTO: 4.9 %
ERYTHROCYTE [DISTWIDTH] IN BLOOD BY AUTOMATED COUNT: 14.5 % (ref 11.5–17)
GFR SERPLBLD CREATININE-BSD FMLA CKD-EPI: >60 MLS/MIN/1.73/M2
GLOBULIN SER-MCNC: 3.5 GM/DL (ref 2.4–3.5)
GLUCOSE SERPL-MCNC: 98 MG/DL (ref 74–100)
HCT VFR BLD AUTO: 42.2 % (ref 37–47)
HDLC SERPL-MCNC: 65 MG/DL (ref 35–60)
HGB BLD-MCNC: 13.6 GM/DL (ref 12–16)
IMM GRANULOCYTES # BLD AUTO: 0.02 X10(3)/MCL (ref 0–0.04)
IMM GRANULOCYTES NFR BLD AUTO: 0.4 %
LDLC SERPL CALC-MCNC: 145 MG/DL (ref 50–140)
LYMPHOCYTES # BLD AUTO: 2.02 X10(3)/MCL (ref 0.6–4.6)
LYMPHOCYTES NFR BLD AUTO: 42.8 %
MCH RBC QN AUTO: 28.6 PG (ref 27–31)
MCHC RBC AUTO-ENTMCNC: 32.2 MG/DL (ref 33–36)
MCV RBC AUTO: 88.7 FL (ref 80–94)
MONOCYTES # BLD AUTO: 0.39 X10(3)/MCL (ref 0.1–1.3)
MONOCYTES NFR BLD AUTO: 8.3 %
NEUTROPHILS # BLD AUTO: 2 X10(3)/MCL (ref 2.1–9.2)
NEUTROPHILS NFR BLD AUTO: 42.5 %
NRBC BLD AUTO-RTO: 0 %
PLATELET # BLD AUTO: 329 X10(3)/MCL (ref 130–400)
PMV BLD AUTO: 9.8 FL (ref 7.4–10.4)
POTASSIUM SERPL-SCNC: 4.6 MMOL/L (ref 3.5–5.1)
PROT SERPL-MCNC: 7.9 GM/DL (ref 6.4–8.3)
RBC # BLD AUTO: 4.76 X10(6)/MCL (ref 4.2–5.4)
SODIUM SERPL-SCNC: 142 MMOL/L (ref 136–145)
TRIGL SERPL-MCNC: 80 MG/DL (ref 37–140)
VLDLC SERPL CALC-MCNC: 16 MG/DL
WBC # SPEC AUTO: 4.7 X10(3)/MCL (ref 4.5–11.5)

## 2022-09-08 PROCEDURE — 85025 COMPLETE CBC W/AUTO DIFF WBC: CPT

## 2022-09-08 PROCEDURE — 90472 IMMUNIZATION ADMIN EACH ADD: CPT | Mod: PBBFAC

## 2022-09-08 PROCEDURE — 99213 OFFICE O/P EST LOW 20 MIN: CPT | Mod: PBBFAC

## 2022-09-08 PROCEDURE — 36415 COLL VENOUS BLD VENIPUNCTURE: CPT

## 2022-09-08 PROCEDURE — 80053 COMPREHEN METABOLIC PANEL: CPT

## 2022-09-08 PROCEDURE — 90756 CCIIV4 VACC ABX FREE IM: CPT | Mod: PBBFAC

## 2022-09-08 PROCEDURE — 80061 LIPID PANEL: CPT

## 2022-09-08 RX ORDER — ASPIRIN 81 MG/1
81 TABLET ORAL DAILY
Qty: 90 TABLET | Refills: 1 | Status: SHIPPED | OUTPATIENT
Start: 2022-09-08 | End: 2023-06-13 | Stop reason: SDUPTHER

## 2022-09-08 NOTE — PROGRESS NOTES
I have reviewed and concur with the resident's history, physical, assessment, and plan.  I have discussed with him all issues related to the diagnosis, workup and treatment plan.Care provided as reasonable and necessary.    Donaldo Martins MD  [facility]

## 2022-09-08 NOTE — TELEPHONE ENCOUNTER
Called and left the patient a voicemail regarding her lipid panel. Will try to get in touch with the patient again.

## 2022-09-27 DIAGNOSIS — I10 HYPERTENSION, UNSPECIFIED TYPE: Primary | ICD-10-CM

## 2022-09-28 RX ORDER — AMLODIPINE BESYLATE 5 MG/1
5 TABLET ORAL DAILY
Qty: 90 TABLET | Refills: 1 | Status: SHIPPED | OUTPATIENT
Start: 2022-09-28 | End: 2023-09-13 | Stop reason: SDUPTHER

## 2023-01-10 ENCOUNTER — TELEPHONE (OUTPATIENT)
Dept: INTERNAL MEDICINE | Facility: CLINIC | Age: 58
End: 2023-01-10
Payer: MEDICAID

## 2023-01-10 NOTE — TELEPHONE ENCOUNTER
Patient requesting to reschedule appt with Ursula Crisostomo. Pt. Cancelled appt for 1/11/2023. Please contact patient with appt date and time. Thanks in advance

## 2023-03-08 DIAGNOSIS — I10 HYPERTENSION, UNSPECIFIED TYPE: Primary | ICD-10-CM

## 2023-03-08 RX ORDER — LISINOPRIL 20 MG/1
20 TABLET ORAL DAILY
Qty: 90 TABLET | Refills: 6 | Status: SHIPPED | OUTPATIENT
Start: 2023-03-08 | End: 2024-02-19 | Stop reason: SDUPTHER

## 2023-04-04 ENCOUNTER — DOCUMENTATION ONLY (OUTPATIENT)
Dept: ADMINISTRATIVE | Facility: HOSPITAL | Age: 58
End: 2023-04-04
Payer: MEDICAID

## 2023-04-28 ENCOUNTER — OFFICE VISIT (OUTPATIENT)
Dept: HEMATOLOGY/ONCOLOGY | Facility: CLINIC | Age: 58
End: 2023-04-28
Payer: MEDICAID

## 2023-04-28 VITALS — RESPIRATION RATE: 20 BRPM | HEIGHT: 64 IN | BODY MASS INDEX: 28.83 KG/M2

## 2023-04-28 DIAGNOSIS — Z85.3 HISTORY OF LEFT BREAST CANCER: Primary | ICD-10-CM

## 2023-04-28 PROCEDURE — 99213 OFFICE O/P EST LOW 20 MIN: CPT | Mod: FQ,95,, | Performed by: NURSE PRACTITIONER

## 2023-04-28 PROCEDURE — 1160F PR REVIEW ALL MEDS BY PRESCRIBER/CLIN PHARMACIST DOCUMENTED: ICD-10-PCS | Mod: CPTII,95,, | Performed by: NURSE PRACTITIONER

## 2023-04-28 PROCEDURE — 3008F BODY MASS INDEX DOCD: CPT | Mod: CPTII,95,, | Performed by: NURSE PRACTITIONER

## 2023-04-28 PROCEDURE — 3008F PR BODY MASS INDEX (BMI) DOCUMENTED: ICD-10-PCS | Mod: CPTII,95,, | Performed by: NURSE PRACTITIONER

## 2023-04-28 PROCEDURE — 1159F PR MEDICATION LIST DOCUMENTED IN MEDICAL RECORD: ICD-10-PCS | Mod: CPTII,95,, | Performed by: NURSE PRACTITIONER

## 2023-04-28 PROCEDURE — 1159F MED LIST DOCD IN RCRD: CPT | Mod: CPTII,95,, | Performed by: NURSE PRACTITIONER

## 2023-04-28 PROCEDURE — 4010F PR ACE/ARB THEARPY RXD/TAKEN: ICD-10-PCS | Mod: CPTII,95,, | Performed by: NURSE PRACTITIONER

## 2023-04-28 PROCEDURE — 4010F ACE/ARB THERAPY RXD/TAKEN: CPT | Mod: CPTII,95,, | Performed by: NURSE PRACTITIONER

## 2023-04-28 PROCEDURE — 1160F RVW MEDS BY RX/DR IN RCRD: CPT | Mod: CPTII,95,, | Performed by: NURSE PRACTITIONER

## 2023-04-28 PROCEDURE — 99213 PR OFFICE/OUTPT VISIT, EST, LEVL III, 20-29 MIN: ICD-10-PCS | Mod: FQ,95,, | Performed by: NURSE PRACTITIONER

## 2023-04-28 NOTE — PROGRESS NOTES
Problem List:  Stage IA T1N0M0 Left Breast Carcinoma - ER 99%+, MO 10%+ and Her2 negative  - 1.3 cm tubular primary, T1c N0  Diagnosed 11/19/15.     Current Treatment:  Breast cancer surveillance    Treatment History:  S/p mastectomy w/ SLNBX x4 on 1/11/16. 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  Tamoxifen 20 mg po daily. 2/4/16-2/7/17  Arimidex started 2-7-17    History of Present Illness: She is an  female diagnosed with breast cancer at the age of 54 years of age. She underwent a routine mammogram that revealed a mass in the left breast.  Further workup with an ultrasound confirmed an irregular hypoechoic mass in the upper outer quadrant, suspicious for malignancy subsequent biopsy proved grade 1 tubular carcinoma with focal DCIS identified.  left breast mastectomy with sentinel lymph node biopsy on January 11, 2016. Results from that procedure showed well-differentiated tubular carcinoma, measuring 1.3 cm. 13 sentinel lymph nodes were examined, all negative.     Interval History 4/28/2023: Patient presents today via telemedicine for breast cancer surveillance visit. She reports doing well without any complaints. She reports good appetite and energy level. She denies any N/V/D/C, unintentional weight loss, changes in bowel/bladder patterns, dyspnea, bone pain, unusual headaches or abnormal vaginal bleeding, lymphedema. She reports compliance with monthly self breast exams, she denies any lumps or masses. She is past due on mammogram. No lab work completed. Discussed follow up appointments with patient.     This is a telemedicine note. Patient was treated using telemedicine, real time audio and video, according to [facility] protocols. I, distant provider, conducted the visit from location identified below. The patient participated in the visit at a non-[facility] location selected by the patient (or patient's representative), identified below. I am licensed in the  state where the patient stated they are located. The patient (or patient's representative) stated that they understood and accepted the privacy and security risks to their information at their location. Patient was located at her/his residence in , [place]. I, distant provider, was located at [facility].    Face to face time spent with patient exceeds 15 minutes, over 50% of which was used for education and counseling regarding medical conditions, current medications including risk/benefit and side effects/adverse events, over the counter medications-uses/doses, home self-care and contact precautions, and red flags and indications for immediate medical attention. The patient is receptive, expresses understanding and is agreeable to plan. All questions answered.    Review of Systems: A complete 12-point ROS was performed in full with pertinent positives as described in interval history. Remainder of ROS done in full and are negative.    Lab Results   Component Value Date    WBC 4.7 09/08/2022    RBC 4.76 09/08/2022    HGB 13.6 09/08/2022    HCT 42.2 09/08/2022    MCV 88.7 09/08/2022    MCH 28.6 09/08/2022    MCHC 32.2 (L) 09/08/2022    RDW 14.5 09/08/2022     09/08/2022    MPV 9.8 09/08/2022     CMP  Sodium Level   Date Value Ref Range Status   09/08/2022 142 136 - 145 mmol/L Final     Potassium Level   Date Value Ref Range Status   09/08/2022 4.6 3.5 - 5.1 mmol/L Final     Carbon Dioxide   Date Value Ref Range Status   09/08/2022 32 (H) 22 - 29 mmol/L Final     Blood Urea Nitrogen   Date Value Ref Range Status   09/08/2022 15.8 9.8 - 20.1 mg/dL Final     Creatinine   Date Value Ref Range Status   09/08/2022 0.79 0.55 - 1.02 mg/dL Final     Calcium Level Total   Date Value Ref Range Status   09/08/2022 10.2 8.4 - 10.2 mg/dL Final     Albumin Level   Date Value Ref Range Status   09/08/2022 4.4 3.5 - 5.0 gm/dL Final     Bilirubin Total   Date Value Ref Range Status   09/08/2022 0.4 <=1.5 mg/dL Final     Alkaline  Phosphatase   Date Value Ref Range Status   09/08/2022 65 40 - 150 unit/L Final     Aspartate Aminotransferase   Date Value Ref Range Status   09/08/2022 19 5 - 34 unit/L Final     Alanine Aminotransferase   Date Value Ref Range Status   09/08/2022 13 0 - 55 unit/L Final     eGFR   Date Value Ref Range Status   09/08/2022 >60 mls/min/1.73/m2 Final       Physical Exam: Physical Exam was not completed today as this was a telemedicine visit.    Assessment/Plan:  1. Malignant neoplasm of upper-outer quadrant of left female breast C50.412 Stage IA [id number] Left Breast Carcinoma - ER 99%+, CT 10%+ and Her2 -ve. Diagnosed 11/2015 1/11/16: mastectomy w/SLNBX x4  - Patient has tubular histology, which is lower risk than standard invasive ductal carcinoma.  8/27/18: Mammogram showed BIRADS Category 2: Benign. No mammographic evidence of malignancy  -4/20/2021: Right screening MMG with manfred: Right Breast: Negative (BI-RADS 1) Left Breast: Benign (BI-RADS 2)     Plan:  No evidence of disease recurrence. Continue surveillance.  Annual screening MMG with manfred due 4/2023; ordered today.  Follow up w/NP (4/2023),with MMG results.    Per NCCN Guidelines for Breast Cancer Surveillance  -History and Physical exam 1-4 times per year as clinically appropriate for 5 years, then annually.  -Periodic screening for changes in family history and referral to genetic counseling as indicated.  -Educate, monitor, and refer for lymphedema management.  -Mammography every 12 months.  -Routine imaging of reconstructed breast is not indicated.  -In the absence of clinical signs and symptoms suggestive of recurrent disease, there is not indication for laboratory or imaging studies for metastases screening.  -Women on Tamoxifen: annual gynecologic assessment every 12 months if uterus present.  -Women on an Aromatase Inhibitor or who experience ovarian failure secondary to treatment should have monitoring of bone health with a bone mineral density  determination at baseline and periodically thereafter.  -Assess and encourage adherence to adjuvant endocrine therapy.  -Evidence suggests that active lifestyle, healthy diet, limited alcohol intake, and achieving and maintaining an ideal body weight (20-25 BMI) may lead to optimal breast cancer outcomes.

## 2023-06-13 DIAGNOSIS — Z85.3 HISTORY OF LEFT BREAST CANCER: ICD-10-CM

## 2023-06-13 DIAGNOSIS — I10 HYPERTENSION, UNSPECIFIED TYPE: ICD-10-CM

## 2023-06-13 DIAGNOSIS — Z00.00 HEALTHCARE MAINTENANCE: ICD-10-CM

## 2023-06-13 RX ORDER — ASPIRIN 81 MG/1
81 TABLET ORAL DAILY
Qty: 90 TABLET | Refills: 1 | Status: SHIPPED | OUTPATIENT
Start: 2023-06-13 | End: 2023-06-15 | Stop reason: SDUPTHER

## 2023-06-15 DIAGNOSIS — Z00.00 HEALTHCARE MAINTENANCE: ICD-10-CM

## 2023-06-15 DIAGNOSIS — Z85.3 HISTORY OF LEFT BREAST CANCER: ICD-10-CM

## 2023-06-15 DIAGNOSIS — I10 HYPERTENSION, UNSPECIFIED TYPE: ICD-10-CM

## 2023-06-15 RX ORDER — ASPIRIN 81 MG/1
81 TABLET ORAL DAILY
Qty: 90 TABLET | Refills: 1 | Status: SHIPPED | OUTPATIENT
Start: 2023-06-15 | End: 2024-02-19

## 2023-07-10 ENCOUNTER — PATIENT MESSAGE (OUTPATIENT)
Dept: ADMINISTRATIVE | Facility: HOSPITAL | Age: 58
End: 2023-07-10
Payer: MEDICAID

## 2023-09-13 ENCOUNTER — OFFICE VISIT (OUTPATIENT)
Dept: GYNECOLOGY | Facility: CLINIC | Age: 58
End: 2023-09-13
Payer: MEDICAID

## 2023-09-13 VITALS
HEART RATE: 80 BPM | BODY MASS INDEX: 29.81 KG/M2 | RESPIRATION RATE: 20 BRPM | OXYGEN SATURATION: 100 % | WEIGHT: 174.63 LBS | SYSTOLIC BLOOD PRESSURE: 162 MMHG | HEIGHT: 64 IN | TEMPERATURE: 99 F | DIASTOLIC BLOOD PRESSURE: 110 MMHG

## 2023-09-13 DIAGNOSIS — Z12.4 PAP SMEAR FOR CERVICAL CANCER SCREENING: Primary | ICD-10-CM

## 2023-09-13 DIAGNOSIS — Z12.31 VISIT FOR SCREENING MAMMOGRAM: ICD-10-CM

## 2023-09-13 DIAGNOSIS — I10 HYPERTENSION, UNSPECIFIED TYPE: ICD-10-CM

## 2023-09-13 PROCEDURE — 88174 CYTOPATH C/V AUTO IN FLUID: CPT | Performed by: NURSE PRACTITIONER

## 2023-09-13 PROCEDURE — 1159F PR MEDICATION LIST DOCUMENTED IN MEDICAL RECORD: ICD-10-PCS | Mod: CPTII,,, | Performed by: NURSE PRACTITIONER

## 2023-09-13 PROCEDURE — 3080F PR MOST RECENT DIASTOLIC BLOOD PRESSURE >= 90 MM HG: ICD-10-PCS | Mod: CPTII,,, | Performed by: NURSE PRACTITIONER

## 2023-09-13 PROCEDURE — 3008F BODY MASS INDEX DOCD: CPT | Mod: CPTII,,, | Performed by: NURSE PRACTITIONER

## 2023-09-13 PROCEDURE — 3008F PR BODY MASS INDEX (BMI) DOCUMENTED: ICD-10-PCS | Mod: CPTII,,, | Performed by: NURSE PRACTITIONER

## 2023-09-13 PROCEDURE — 99386 PREV VISIT NEW AGE 40-64: CPT | Mod: S$PBB,,, | Performed by: NURSE PRACTITIONER

## 2023-09-13 PROCEDURE — 4010F ACE/ARB THERAPY RXD/TAKEN: CPT | Mod: CPTII,,, | Performed by: NURSE PRACTITIONER

## 2023-09-13 PROCEDURE — 3080F DIAST BP >= 90 MM HG: CPT | Mod: CPTII,,, | Performed by: NURSE PRACTITIONER

## 2023-09-13 PROCEDURE — 99386 PR PREVENTIVE VISIT,NEW,40-64: ICD-10-PCS | Mod: S$PBB,,, | Performed by: NURSE PRACTITIONER

## 2023-09-13 PROCEDURE — 3077F PR MOST RECENT SYSTOLIC BLOOD PRESSURE >= 140 MM HG: ICD-10-PCS | Mod: CPTII,,, | Performed by: NURSE PRACTITIONER

## 2023-09-13 PROCEDURE — 99214 OFFICE O/P EST MOD 30 MIN: CPT | Mod: PBBFAC | Performed by: NURSE PRACTITIONER

## 2023-09-13 PROCEDURE — 1159F MED LIST DOCD IN RCRD: CPT | Mod: CPTII,,, | Performed by: NURSE PRACTITIONER

## 2023-09-13 PROCEDURE — 4010F PR ACE/ARB THEARPY RXD/TAKEN: ICD-10-PCS | Mod: CPTII,,, | Performed by: NURSE PRACTITIONER

## 2023-09-13 PROCEDURE — 3077F SYST BP >= 140 MM HG: CPT | Mod: CPTII,,, | Performed by: NURSE PRACTITIONER

## 2023-09-13 PROCEDURE — 87624 HPV HI-RISK TYP POOLED RSLT: CPT

## 2023-09-13 RX ORDER — AMLODIPINE BESYLATE 5 MG/1
5 TABLET ORAL DAILY
Qty: 90 TABLET | Refills: 0 | Status: SHIPPED | OUTPATIENT
Start: 2023-09-13 | End: 2024-02-19 | Stop reason: SDUPTHER

## 2023-09-13 NOTE — PROGRESS NOTES
"  Subjective:       Patient ID: Lucille Pabon is a 58 y.o. female.    Chief Complaint:  Gynecologic Exam      History of Present Illness  The patient  here for annual exam. Pt is postmenopausal since  following breast cancer treatment, hx of Lt mastectomy. Denies history of abnormal paps. Last GYM exam was . MG-22 & BIRADS 2. Denies breast or urinary complaints. Denies pelvic pain, abnormal bleeding or discharge. Pt reports no STIs in the past and no concerns. Denies tobacco use. Dep. screening 0. Denies fly hx of breast, ovarian, uterine or colon cancer. Cologuard neg in . BP-180/101 and manual 169/110. Currently out of Amlodipine x1 week per pt. Pt is asymptomatic.    GYN & OB History  No LMP recorded. Patient is postmenopausal.   Date of Last Pap: 10/1/2015    Review of patient's allergies indicates:  No Known Allergies  Past Medical History:   Diagnosis Date    Anemia     Breast cancer     Hypertension      OB History    Para Term  AB Living   7 7           SAB IAB Ectopic Multiple Live Births                  # Outcome Date GA Lbr Simeon/2nd Weight Sex Delivery Anes PTL Lv   7 Para            6 Para            5 Para            4 Para            3 Para            2 Para            1 Para                 Review of Systems  Review of Systems    Negative except for pertinent findings for positives per HPI     Objective:    Physical Exam    BP (!) 162/110   Pulse 80   Temp 98.6 °F (37 °C) (Oral)   Resp 20   Ht 5' 4" (1.626 m)   Wt 79.2 kg (174 lb 9.6 oz)   SpO2 100%   BMI 29.97 kg/m²   GENERAL: Well-developed female in no acute distress.  SKIN: Normal to inspection, warm and intact.  BREASTS: No masses, lumps, discharge, tenderness. Lt mastectomy  VULVA: General appearance normal; external genitalia with no lesions or erythema.  VAGINA: Mucosa atrophic, no abnormal discharge or lesions.  CERVIX: Atrophic, slightly friable, no abnormal discharge.  BIMANUAL EXAM: " reveals a 10 week-sized uterus. The uterus is non tender. Aries adnexa reveal no tenderness.  PSYCHIATRIC: Patient is oriented to person, place, and time. Mood and affect are normal.    Assessment:       1. Pap smear for cervical cancer screening    2. Visit for screening mammogram    3. Hypertension, unspecified type       Plan:   Lucille was seen today for gynecologic exam.    Diagnoses and all orders for this visit:    Pap smear for cervical cancer screening  -     Liquid-Based Pap Smear, Screening Screening    Visit for screening mammogram  -     Mammo Digital Screening Bilat w/ Keegan; Future    Hypertension, unspecified type  -     amLODIPine (NORVASC) 5 MG tablet; Take 1 tablet (5 mg total) by mouth once daily.    Pap today  MG ordered  Manual BP is 162/110. Pt states she has been out of Amlodipine x1 week. Missed multiple appts with PCP. Refill amlodipine x 90 days and pt instructed to keep appt with PCP on 11/2/23. ER precautions given.  Follow up in about 1 year (around 9/13/2024) for annual exam.

## 2023-09-15 LAB — PSYCHE PATHOLOGY RESULT: NORMAL

## 2023-09-20 ENCOUNTER — HOSPITAL ENCOUNTER (OUTPATIENT)
Dept: RADIOLOGY | Facility: HOSPITAL | Age: 58
Discharge: HOME | End: 2023-09-20
Attending: NURSE PRACTITIONER
Payer: MEDICAID

## 2023-09-20 DIAGNOSIS — Z12.31 VISIT FOR SCREENING MAMMOGRAM: ICD-10-CM

## 2023-09-20 PROCEDURE — 77063 BREAST TOMOSYNTHESIS BI: CPT | Mod: 26,,, | Performed by: RADIOLOGY

## 2023-09-20 PROCEDURE — 77067 SCR MAMMO BI INCL CAD: CPT | Mod: 26,,, | Performed by: RADIOLOGY

## 2023-09-20 PROCEDURE — 77067 MAMMO DIGITAL SCREENING BILAT WITH TOMO: ICD-10-PCS | Mod: 26,,, | Performed by: RADIOLOGY

## 2023-09-20 PROCEDURE — 77063 MAMMO DIGITAL SCREENING BILAT WITH TOMO: ICD-10-PCS | Mod: 26,,, | Performed by: RADIOLOGY

## 2023-09-20 PROCEDURE — 77067 SCR MAMMO BI INCL CAD: CPT | Mod: TC

## 2023-12-05 ENCOUNTER — TELEPHONE (OUTPATIENT)
Dept: INTERNAL MEDICINE | Facility: CLINIC | Age: 58
End: 2023-12-05
Payer: MEDICAID

## 2023-12-05 NOTE — TELEPHONE ENCOUNTER
Called and spoke to patient. Patient states she took her blood pressure medications this morning at 6 am and is complaining of a slight headache. She took her b/p using her b/p cuff at home a b/p 161/99 was resulted. Advise patient to go to the urgent care or er for further evaluation given b/p reading and complaints.

## 2023-12-06 NOTE — TELEPHONE ENCOUNTER
Called patient multiple times and was unable to reach her. Left a voicemail regarding checking BP at home and to maintain a BP log. Will likely to up titrate current antihypertensive regimen if home BP remains elevated consistently.     Joe Marti DO  [facility] Internal Medicine PGY-II

## 2024-02-16 NOTE — PROGRESS NOTES
"[facility] INTERNAL MEDICINE  OUTPATIENT OFFICE VISIT NOTE    SUBJECTIVE:      HPI: Lucille Pabon is a 56 y.o. yo female w/ PMH of HTN, and left breast carcinoma (diagnosed 1/20/2016) s/p mastectomy, who presents today for regular F/U. She has no acute complaints at this time but states that she has been under a lot of stress d/t work.      ROS:  (-) Chest pain, palpitations, SOB, fever, night sweats, chills, diarrhea, constipation.     OBJECTIVE:     Vital signs:   BP (!) 158/92 (BP Location: Left arm)   Pulse 75   Temp 98.3 °F (36.8 °C) (Oral)   Resp 18   Ht 5' 4" (1.626 m)   Wt 77.4 kg (170 lb 9.6 oz)   SpO2 100%   BMI 29.28 kg/m²      Physical Examination:  General: Well nourished w/o distress  HEENT: NC/AT; PERRL; nasal and oral mucosa moist and clear  Pulm: CTA bilaterally, normal work of breathing  CV: S1, S2 w/o murmurs or gallops; no edema noted  GI: Soft with normal bowel sounds in all quadrants, no masses on palpation  MSK: Full ROM of all extremities  Neuro: AAOx4; motor/sensory function intact  Psych: Cooperative; appropriate mood and affect    Significant findings:  4/13/2021 - Right Breast: Negative (BI-RADS 1); Left Breast: Benign (BI-RADS 2)   4/20/2021 - DXA Bone Density test shows measured bone mineral density remains within normal limits  4/22/2022 - Mammogram shows Right Breast: Negative (BI-RADS 1); Left Breast: Benign (BI-RADS 2)     ASSESSMENT & PLAN:     HTN   -BP elevated at this time, asymptomatic; she states that she ran out of her BP meds a few days ago  -Home SBP in the 150's and DBP in the 90's  -Continue Lisinopril; increased amlodipine to 10mg daily on 2/19/2024      HLD  -Will repeat lipid panel today  -Patient would like to pursue lifestyle modifications for now  -On-going discussion regarding statin therapy    HX of left breast carcinoma (ER+, HER2+)  -S/P mastectomy w/ sentinel node BX on 1/11/2016 showing Stage IA invasive tubular carcinoma and ductal carcinoma in " situ  -Completed Tamoxifen therapy 2/17/2017  -Currently being seen by [facility] Oncology; continue regular surveillance per Oncology's recommendation   -Mammogram 9/21/2023: s/p left mastectomy changes  -Dexa scan 3/14/2019 showed normal bone density     Health Maintenance:  -Seeing [facility] GYN clinic  -Last colonoscopy 8/19/2016 WNL; repeat q10 years  -Vaccines:     -Tdap (2/16/2017)    -Prevnar (2/20/2019)    -Flu shot (9/8/2022)    -COVID vaccine #1 (3/22/2021); #2 (4/20/2021); #3 (11/12/2021)    -Shingrix #1 (5/3/2022) #2 (9/8/2022)     Return to clinic in 6 months. F/U CMP, CBC, lipid panel.    Joe Marti DO   [facility], PGY-III

## 2024-02-19 ENCOUNTER — LAB VISIT (OUTPATIENT)
Dept: LAB | Facility: HOSPITAL | Age: 59
End: 2024-02-19
Attending: STUDENT IN AN ORGANIZED HEALTH CARE EDUCATION/TRAINING PROGRAM
Payer: MEDICAID

## 2024-02-19 ENCOUNTER — OFFICE VISIT (OUTPATIENT)
Dept: INTERNAL MEDICINE | Facility: CLINIC | Age: 59
End: 2024-02-19
Payer: MEDICAID

## 2024-02-19 VITALS
OXYGEN SATURATION: 100 % | HEART RATE: 75 BPM | BODY MASS INDEX: 29.13 KG/M2 | WEIGHT: 170.63 LBS | SYSTOLIC BLOOD PRESSURE: 158 MMHG | HEIGHT: 64 IN | DIASTOLIC BLOOD PRESSURE: 92 MMHG | RESPIRATION RATE: 18 BRPM | TEMPERATURE: 98 F

## 2024-02-19 DIAGNOSIS — E78.5 HYPERLIPIDEMIA, UNSPECIFIED HYPERLIPIDEMIA TYPE: ICD-10-CM

## 2024-02-19 DIAGNOSIS — Z85.3 HISTORY OF LEFT BREAST CANCER: ICD-10-CM

## 2024-02-19 DIAGNOSIS — I10 PRIMARY HYPERTENSION: Primary | ICD-10-CM

## 2024-02-19 DIAGNOSIS — I10 HYPERTENSION, UNSPECIFIED TYPE: ICD-10-CM

## 2024-02-19 DIAGNOSIS — Z00.00 HEALTHCARE MAINTENANCE: ICD-10-CM

## 2024-02-19 DIAGNOSIS — I10 PRIMARY HYPERTENSION: ICD-10-CM

## 2024-02-19 LAB
ALBUMIN SERPL-MCNC: 4 G/DL (ref 3.5–5)
ALBUMIN/GLOB SERPL: 1 RATIO (ref 1.1–2)
ALP SERPL-CCNC: 61 UNIT/L (ref 40–150)
ALT SERPL-CCNC: 14 UNIT/L (ref 0–55)
AST SERPL-CCNC: 16 UNIT/L (ref 5–34)
BASOPHILS # BLD AUTO: 0.05 X10(3)/MCL
BASOPHILS NFR BLD AUTO: 1.2 %
BILIRUB SERPL-MCNC: 0.4 MG/DL
BUN SERPL-MCNC: 17.6 MG/DL (ref 9.8–20.1)
CALCIUM SERPL-MCNC: 9.7 MG/DL (ref 8.4–10.2)
CHLORIDE SERPL-SCNC: 103 MMOL/L (ref 98–107)
CHOLEST SERPL-MCNC: 197 MG/DL
CHOLEST/HDLC SERPL: 3 {RATIO} (ref 0–5)
CO2 SERPL-SCNC: 31 MMOL/L (ref 22–29)
CREAT SERPL-MCNC: 0.8 MG/DL (ref 0.55–1.02)
EOSINOPHIL # BLD AUTO: 0.2 X10(3)/MCL (ref 0–0.9)
EOSINOPHIL NFR BLD AUTO: 4.8 %
ERYTHROCYTE [DISTWIDTH] IN BLOOD BY AUTOMATED COUNT: 14.3 % (ref 11.5–17)
GFR SERPLBLD CREATININE-BSD FMLA CKD-EPI: >60 MLS/MIN/1.73/M2
GLOBULIN SER-MCNC: 3.9 GM/DL (ref 2.4–3.5)
GLUCOSE SERPL-MCNC: 93 MG/DL (ref 74–100)
HCT VFR BLD AUTO: 41 % (ref 37–47)
HDLC SERPL-MCNC: 66 MG/DL (ref 35–60)
HGB BLD-MCNC: 13.6 G/DL (ref 12–16)
IMM GRANULOCYTES # BLD AUTO: 0.01 X10(3)/MCL (ref 0–0.04)
IMM GRANULOCYTES NFR BLD AUTO: 0.2 %
LDLC SERPL CALC-MCNC: 114 MG/DL (ref 50–140)
LYMPHOCYTES # BLD AUTO: 1.92 X10(3)/MCL (ref 0.6–4.6)
LYMPHOCYTES NFR BLD AUTO: 45.8 %
MCH RBC QN AUTO: 28.9 PG (ref 27–31)
MCHC RBC AUTO-ENTMCNC: 33.2 G/DL (ref 33–36)
MCV RBC AUTO: 87.2 FL (ref 80–94)
MONOCYTES # BLD AUTO: 0.32 X10(3)/MCL (ref 0.1–1.3)
MONOCYTES NFR BLD AUTO: 7.6 %
NEUTROPHILS # BLD AUTO: 1.69 X10(3)/MCL (ref 2.1–9.2)
NEUTROPHILS NFR BLD AUTO: 40.4 %
NRBC BLD AUTO-RTO: 0 %
PLATELET # BLD AUTO: 363 X10(3)/MCL (ref 130–400)
PMV BLD AUTO: 10 FL (ref 7.4–10.4)
POTASSIUM SERPL-SCNC: 4.3 MMOL/L (ref 3.5–5.1)
PROT SERPL-MCNC: 7.9 GM/DL (ref 6.4–8.3)
RBC # BLD AUTO: 4.7 X10(6)/MCL (ref 4.2–5.4)
SODIUM SERPL-SCNC: 142 MMOL/L (ref 136–145)
TRIGL SERPL-MCNC: 87 MG/DL (ref 37–140)
VLDLC SERPL CALC-MCNC: 17 MG/DL
WBC # SPEC AUTO: 4.19 X10(3)/MCL (ref 4.5–11.5)

## 2024-02-19 PROCEDURE — 80053 COMPREHEN METABOLIC PANEL: CPT

## 2024-02-19 PROCEDURE — 80061 LIPID PANEL: CPT

## 2024-02-19 PROCEDURE — 99214 OFFICE O/P EST MOD 30 MIN: CPT | Mod: PBBFAC | Performed by: STUDENT IN AN ORGANIZED HEALTH CARE EDUCATION/TRAINING PROGRAM

## 2024-02-19 PROCEDURE — 36415 COLL VENOUS BLD VENIPUNCTURE: CPT

## 2024-02-19 PROCEDURE — 85025 COMPLETE CBC W/AUTO DIFF WBC: CPT

## 2024-02-19 RX ORDER — AMLODIPINE BESYLATE 10 MG/1
10 TABLET ORAL DAILY
Qty: 90 TABLET | Refills: 3 | Status: SHIPPED | OUTPATIENT
Start: 2024-02-19 | End: 2025-02-18

## 2024-02-19 RX ORDER — AMLODIPINE BESYLATE 5 MG/1
10 TABLET ORAL DAILY
Qty: 90 TABLET | Refills: 0 | Status: SHIPPED | OUTPATIENT
Start: 2024-02-19 | End: 2024-02-19

## 2024-02-19 RX ORDER — AMLODIPINE BESYLATE 5 MG/1
10 TABLET ORAL DAILY
Qty: 180 TABLET | Refills: 3 | Status: SHIPPED | OUTPATIENT
Start: 2024-02-19 | End: 2024-02-19

## 2024-02-19 RX ORDER — LISINOPRIL 20 MG/1
20 TABLET ORAL DAILY
Qty: 90 TABLET | Refills: 6 | Status: SHIPPED | OUTPATIENT
Start: 2024-02-19 | End: 2024-05-11 | Stop reason: SDUPTHER

## 2024-03-03 NOTE — PROGRESS NOTES
Attending Addendum:   Patient seen and examined in clinic. Management and Plan were discussed with resident. Care was reasonable and necessary.   Elvie Heard MD  [facility] - Internal Medicine

## 2024-05-11 DIAGNOSIS — Z00.00 HEALTHCARE MAINTENANCE: ICD-10-CM

## 2024-05-11 DIAGNOSIS — Z85.3 HISTORY OF LEFT BREAST CANCER: ICD-10-CM

## 2024-05-11 DIAGNOSIS — I10 HYPERTENSION, UNSPECIFIED TYPE: ICD-10-CM

## 2024-05-14 RX ORDER — LISINOPRIL 20 MG/1
20 TABLET ORAL DAILY
Qty: 90 TABLET | Refills: 6 | Status: SHIPPED | OUTPATIENT
Start: 2024-05-14 | End: 2026-02-03

## 2024-05-14 RX ORDER — ASPIRIN 81 MG/1
81 TABLET ORAL DAILY
Qty: 90 TABLET | Refills: 1 | Status: SHIPPED | OUTPATIENT
Start: 2024-05-14 | End: 2024-11-10

## 2024-05-20 DIAGNOSIS — Z85.3 HISTORY OF LEFT BREAST CANCER: Primary | ICD-10-CM

## 2024-05-31 ENCOUNTER — TELEPHONE (OUTPATIENT)
Dept: HEMATOLOGY/ONCOLOGY | Facility: CLINIC | Age: 59
End: 2024-05-31
Payer: MEDICAID

## 2024-06-05 ENCOUNTER — LAB VISIT (OUTPATIENT)
Dept: HEMATOLOGY/ONCOLOGY | Facility: CLINIC | Age: 59
End: 2024-06-05
Payer: MEDICAID

## 2024-06-05 ENCOUNTER — OFFICE VISIT (OUTPATIENT)
Dept: HEMATOLOGY/ONCOLOGY | Facility: CLINIC | Age: 59
End: 2024-06-05
Payer: MEDICAID

## 2024-06-05 VITALS
TEMPERATURE: 99 F | HEIGHT: 64 IN | SYSTOLIC BLOOD PRESSURE: 142 MMHG | HEART RATE: 94 BPM | DIASTOLIC BLOOD PRESSURE: 89 MMHG | BODY MASS INDEX: 28.1 KG/M2 | WEIGHT: 164.63 LBS | RESPIRATION RATE: 20 BRPM | OXYGEN SATURATION: 100 %

## 2024-06-05 DIAGNOSIS — Z85.3 HISTORY OF LEFT BREAST CANCER: ICD-10-CM

## 2024-06-05 DIAGNOSIS — Z85.3 HISTORY OF LEFT BREAST CANCER: Primary | ICD-10-CM

## 2024-06-05 LAB
ALBUMIN SERPL-MCNC: 4 G/DL (ref 3.5–5)
ALBUMIN/GLOB SERPL: 1.1 RATIO (ref 1.1–2)
ALP SERPL-CCNC: 58 UNIT/L (ref 40–150)
ALT SERPL-CCNC: 12 UNIT/L (ref 0–55)
ANION GAP SERPL CALC-SCNC: 9 MEQ/L
AST SERPL-CCNC: 16 UNIT/L (ref 5–34)
BASOPHILS # BLD AUTO: 0.04 X10(3)/MCL
BASOPHILS NFR BLD AUTO: 1 %
BILIRUB SERPL-MCNC: 0.4 MG/DL
BUN SERPL-MCNC: 23.2 MG/DL (ref 9.8–20.1)
CALCIUM SERPL-MCNC: 10 MG/DL (ref 8.4–10.2)
CHLORIDE SERPL-SCNC: 105 MMOL/L (ref 98–107)
CO2 SERPL-SCNC: 29 MMOL/L (ref 22–29)
CREAT SERPL-MCNC: 0.88 MG/DL (ref 0.55–1.02)
CREAT/UREA NIT SERPL: 26
EOSINOPHIL # BLD AUTO: 0.24 X10(3)/MCL (ref 0–0.9)
EOSINOPHIL NFR BLD AUTO: 5.7 %
ERYTHROCYTE [DISTWIDTH] IN BLOOD BY AUTOMATED COUNT: 14.1 % (ref 11.5–17)
GFR SERPLBLD CREATININE-BSD FMLA CKD-EPI: >60 ML/MIN/1.73/M2
GLOBULIN SER-MCNC: 3.7 GM/DL (ref 2.4–3.5)
GLUCOSE SERPL-MCNC: 121 MG/DL (ref 74–100)
HCT VFR BLD AUTO: 38.5 % (ref 37–47)
HGB BLD-MCNC: 12.6 G/DL (ref 12–16)
IMM GRANULOCYTES # BLD AUTO: 0.02 X10(3)/MCL (ref 0–0.04)
IMM GRANULOCYTES NFR BLD AUTO: 0.5 %
LYMPHOCYTES # BLD AUTO: 1.63 X10(3)/MCL (ref 0.6–4.6)
LYMPHOCYTES NFR BLD AUTO: 38.8 %
MAGNESIUM SERPL-MCNC: 2.2 MG/DL (ref 1.6–2.6)
MCH RBC QN AUTO: 28.4 PG (ref 27–31)
MCHC RBC AUTO-ENTMCNC: 32.7 G/DL (ref 33–36)
MCV RBC AUTO: 86.7 FL (ref 80–94)
MONOCYTES # BLD AUTO: 0.36 X10(3)/MCL (ref 0.1–1.3)
MONOCYTES NFR BLD AUTO: 8.6 %
NEUTROPHILS # BLD AUTO: 1.91 X10(3)/MCL (ref 2.1–9.2)
NEUTROPHILS NFR BLD AUTO: 45.4 %
NRBC BLD AUTO-RTO: 0 %
PLATELET # BLD AUTO: 333 X10(3)/MCL (ref 130–400)
PMV BLD AUTO: 9.7 FL (ref 7.4–10.4)
POTASSIUM SERPL-SCNC: 4.1 MMOL/L (ref 3.5–5.1)
PROT SERPL-MCNC: 7.7 GM/DL (ref 6.4–8.3)
RBC # BLD AUTO: 4.44 X10(6)/MCL (ref 4.2–5.4)
SODIUM SERPL-SCNC: 143 MMOL/L (ref 136–145)
WBC # SPEC AUTO: 4.2 X10(3)/MCL (ref 4.5–11.5)

## 2024-06-05 PROCEDURE — 1160F RVW MEDS BY RX/DR IN RCRD: CPT | Mod: CPTII,,, | Performed by: NURSE PRACTITIONER

## 2024-06-05 PROCEDURE — 85025 COMPLETE CBC W/AUTO DIFF WBC: CPT

## 2024-06-05 PROCEDURE — 83735 ASSAY OF MAGNESIUM: CPT

## 2024-06-05 PROCEDURE — 99213 OFFICE O/P EST LOW 20 MIN: CPT | Mod: PBBFAC | Performed by: NURSE PRACTITIONER

## 2024-06-05 PROCEDURE — 80053 COMPREHEN METABOLIC PANEL: CPT

## 2024-06-05 PROCEDURE — 3008F BODY MASS INDEX DOCD: CPT | Mod: CPTII,,, | Performed by: NURSE PRACTITIONER

## 2024-06-05 PROCEDURE — 36415 COLL VENOUS BLD VENIPUNCTURE: CPT

## 2024-06-05 PROCEDURE — 4010F ACE/ARB THERAPY RXD/TAKEN: CPT | Mod: CPTII,,, | Performed by: NURSE PRACTITIONER

## 2024-06-05 PROCEDURE — 99214 OFFICE O/P EST MOD 30 MIN: CPT | Mod: S$PBB,,, | Performed by: NURSE PRACTITIONER

## 2024-06-05 PROCEDURE — 1159F MED LIST DOCD IN RCRD: CPT | Mod: CPTII,,, | Performed by: NURSE PRACTITIONER

## 2024-06-05 PROCEDURE — 3079F DIAST BP 80-89 MM HG: CPT | Mod: CPTII,,, | Performed by: NURSE PRACTITIONER

## 2024-06-05 PROCEDURE — 3077F SYST BP >= 140 MM HG: CPT | Mod: CPTII,,, | Performed by: NURSE PRACTITIONER

## 2024-06-05 NOTE — PROGRESS NOTES
Problem List:  Stage IA T1N0M0 Left Breast Carcinoma - ER 99%+, [place] 10%+ and Her2 negative  - 1.3 cm tubular primary, T1c N0  Diagnosed 11/19/15.     Current Treatment:  Breast cancer surveillance    Treatment History:  S/p mastectomy w/ SLNBX x4 on 1/11/16. 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  Tamoxifen 20 mg po daily. 2/4/16-2/7/17  Arimidex started 2-7-17    History of Present Illness: She is an  female diagnosed with breast cancer at the age of 54 years of age. She underwent a routine mammogram that revealed a mass in the left breast.  Further workup with an ultrasound confirmed an irregular hypoechoic mass in the upper outer quadrant, suspicious for malignancy subsequent biopsy proved grade 1 tubular carcinoma with focal DCIS identified.  left breast mastectomy with sentinel lymph node biopsy on January 11, 2016. Results from that procedure showed well-differentiated tubular carcinoma, measuring 1.3 cm. 13 sentinel lymph nodes were examined, all negative.     Interval History 6/5/2024: Patient presents today for a scheduled breast cancer surveillance visit.  Patient reports doing well without any significant reportable symptoms.  She denies any unusual headaches, unintentional weight loss, dyspnea, abdominal pain, changes in bowel/bladder patterns, sudden bone pain, abnormal vaginal bleeding, lymphedema, fever or any signs of infection.  She is up-to-date with mammogram.  Most recent mammogram was negative for any concern of malignancy.  Lab work reviewed with the patient stable.  We discussed plan of care and follow-up.    Review of Systems   All other systems reviewed and are negative.    Physical Exam  Constitutional:       Appearance: Normal appearance.   HENT:      Head: Normocephalic.      Mouth/Throat:      Mouth: Mucous membranes are moist.   Eyes:      Pupils: Pupils are equal, round, and reactive to light.   Cardiovascular:      Rate and  Rhythm: Normal rate and regular rhythm.      Pulses: Normal pulses.      Heart sounds: Normal heart sounds.   Pulmonary:      Effort: Pulmonary effort is normal.      Breath sounds: Normal breath sounds.   Chest:   Breasts:     Right: Normal.      Left: Absent.   Abdominal:      General: Bowel sounds are normal.      Palpations: Abdomen is soft.   Musculoskeletal:         General: Normal range of motion.      Cervical back: Normal range of motion.   Skin:     General: Skin is warm and dry.      Capillary Refill: Capillary refill takes less than 2 seconds.   Neurological:      General: No focal deficit present.      Mental Status: She is alert and oriented to person, place, and time.   Psychiatric:         Attention and Perception: Attention normal.         Mood and Affect: Affect normal.         Speech: Speech normal.         Behavior: Behavior normal.         Thought Content: Thought content normal.       Vitals:    06/05/24 0823   BP: (!) 142/89   Pulse: 94   Resp: 20   Temp: 98.5 °F (36.9 °C)     Lab Results   Component Value Date    WBC 4.20 (L) 06/05/2024    RBC 4.44 06/05/2024    HGB 12.6 06/05/2024    HCT 38.5 06/05/2024    MCV 86.7 06/05/2024    MCH 28.4 06/05/2024    MCHC 32.7 (L) 06/05/2024    RDW 14.1 06/05/2024     06/05/2024    MPV 9.7 06/05/2024     CMP  Sodium   Date Value Ref Range Status   06/05/2024 143 136 - 145 mmol/L Final     Potassium   Date Value Ref Range Status   06/05/2024 4.1 3.5 - 5.1 mmol/L Final     Chloride   Date Value Ref Range Status   06/05/2024 105 98 - 107 mmol/L Final     CO2   Date Value Ref Range Status   06/05/2024 29 22 - 29 mmol/L Final     Blood Urea Nitrogen   Date Value Ref Range Status   06/05/2024 23.2 (H) 9.8 - 20.1 mg/dL Final     Creatinine   Date Value Ref Range Status   06/05/2024 0.88 0.55 - 1.02 mg/dL Final     Calcium   Date Value Ref Range Status   06/05/2024 10.0 8.4 - 10.2 mg/dL Final     Albumin   Date Value Ref Range Status   06/05/2024 4.0 3.5 -  5.0 g/dL Final     Bilirubin Total   Date Value Ref Range Status   06/05/2024 0.4 <=1.5 mg/dL Final     ALP   Date Value Ref Range Status   06/05/2024 58 40 - 150 unit/L Final     AST   Date Value Ref Range Status   06/05/2024 16 5 - 34 unit/L Final     ALT   Date Value Ref Range Status   06/05/2024 12 0 - 55 unit/L Final     eGFR   Date Value Ref Range Status   06/05/2024 >60 mL/min/1.73/m2 Final     Assessment/Plan:  1. Malignant neoplasm of upper-outer quadrant of left female breast C50.412 Stage IA [id number] Left Breast Carcinoma - ER 99%+, [place] 10%+ and Her2 -ve. Diagnosed 11/2015 1/11/16: mastectomy w/SLNBX x4  - Patient has tubular histology, which is lower risk than standard invasive ductal carcinoma.  8/27/18: Mammogram showed BIRADS Category 2: Benign. No mammographic evidence of malignancy  -4/20/2021: Right screening MMG with manfred: Right Breast: Negative (BI-RADS 1) Left Breast: Benign (BI-RADS 2)     Plan:  Malignant neoplasm of upper-outer quadrant of left female breast  Per NCCN Guidelines for Breast Cancer Surveillance  -History and Physical exam 1-4 times per year as clinically appropriate for 5 years, then annually.  -Periodic screening for changes in family history and referral to genetic counseling as indicated.  -Educate, monitor, and refer for lymphedema management.  -Mammography every 12 months.  -Routine imaging of reconstructed breast is not indicated.  -In the absence of clinical signs and symptoms suggestive of recurrent disease, there is not indication for laboratory or imaging studies for metastases screening.  -Women on Tamoxifen: annual gynecologic assessment every 12 months if uterus present.  -Women on an Aromatase Inhibitor or who experience ovarian failure secondary to treatment should have monitoring of bone health with a bone mineral density determination at baseline and periodically thereafter.  -Assess and encourage adherence to adjuvant endocrine therapy.  -Evidence suggests that  active lifestyle, healthy diet, limited alcohol intake, and achieving and maintaining an ideal body weight (20-25 BMI) may lead to optimal breast cancer outcomes.       -No evidence of disease recurrence.   -Continue surveillance.  -Annual screening MMG with manfred due 9/2024; ordered today.  -Follow up w/NP in 6 months no lab work via telemedicine with mammogram  results.

## 2024-09-26 ENCOUNTER — HOSPITAL ENCOUNTER (OUTPATIENT)
Dept: RADIOLOGY | Facility: HOSPITAL | Age: 59
Discharge: HOME | End: 2024-09-26
Attending: NURSE PRACTITIONER
Payer: MEDICAID

## 2024-09-26 DIAGNOSIS — Z85.3 HISTORY OF LEFT BREAST CANCER: ICD-10-CM

## 2024-09-26 PROCEDURE — 77063 BREAST TOMOSYNTHESIS BI: CPT | Mod: 26,52,, | Performed by: RADIOLOGY

## 2024-09-26 PROCEDURE — 77063 BREAST TOMOSYNTHESIS BI: CPT | Mod: TC,52

## 2024-09-26 PROCEDURE — 77067 SCR MAMMO BI INCL CAD: CPT | Mod: 26,52,, | Performed by: RADIOLOGY

## 2024-09-26 PROCEDURE — 77067 SCR MAMMO BI INCL CAD: CPT | Mod: TC,52

## 2024-12-05 ENCOUNTER — TELEPHONE (OUTPATIENT)
Dept: HEMATOLOGY/ONCOLOGY | Facility: CLINIC | Age: 59
End: 2024-12-05
Payer: MEDICAID

## 2024-12-06 ENCOUNTER — OFFICE VISIT (OUTPATIENT)
Dept: HEMATOLOGY/ONCOLOGY | Facility: CLINIC | Age: 59
End: 2024-12-06
Payer: MEDICAID

## 2024-12-06 DIAGNOSIS — Z85.3 HISTORY OF LEFT BREAST CANCER: ICD-10-CM

## 2024-12-06 NOTE — PROGRESS NOTES
Established Patient - Video Telehealth Visit     The patient location is: Work  The chief complaint leading to consultation is: Therapy Education  Visit type: Virtual visit with Video Visual  Total time spent with patient: 30 mins        Each patient to whom I provide medical services by telemedicine is:  (1) informed of the relationship between the physician and patient and the respective role of any other health care provider with respect to management of the patient; and (2) notified that they may decline to receive medical services by telemedicine and may withdraw from such care at any time. Patient verbally consented to receive this Video service.     This service was not originating from a related E/M service provided within the previous 7 days nor will  to an E/M service or procedure within the next 24 hours or my soonest available appointment.  Prevailing standard of care was able to be met in this video visit.         Problem List:  Stage IA [id number] Left Breast Carcinoma - [place] 99%+, [place] 10%+ and Her2 negative  - 1.3 cm tubular primary, T1c N0  Diagnosed 11/19/15.     Current Treatment:  Breast cancer surveillance    Treatment History:  S/p mastectomy w/ SLNBX x4 on 1/11/16. 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  Tamoxifen 20 mg po daily. 2/4/16-2/7/17  Arimidex started 2-7-17- Feb 2022 (per patient)     History of Present Illness: She is an  female diagnosed with breast cancer at the age of 54 years of age. She underwent a routine mammogram that revealed a mass in the left breast.  Further workup with an ultrasound confirmed an irregular hypoechoic mass in the upper outer quadrant, suspicious for malignancy subsequent biopsy proved grade 1 tubular carcinoma with focal DCIS identified.  left breast mastectomy with sentinel lymph node biopsy on January 11, 2016. Results from that procedure showed well-differentiated tubular carcinoma,  measuring 1.3 cm. 13 sentinel lymph nodes were examined, all negative.     Interval History     12/6/24  Ms. Pabon presents today for follow-up via video chat. She reports feeling well. She denies any new lumps of masses in her breast, bone pain, headaches or dizziness. She has been taking calcium and Vitamin D. She has been trying to eat healthy and exercise. She was seen in the ER for UTI and viral GI infection at the beginning of November which has since resolved.     6/5/2024: Patient presents today for a scheduled breast cancer surveillance visit.  Patient reports doing well without any significant reportable symptoms.  She denies any unusual headaches, unintentional weight loss, dyspnea, abdominal pain, changes in bowel/bladder patterns, sudden bone pain, abnormal vaginal bleeding, lymphedema, fever or any signs of infection.  She is up-to-date with mammogram.  Most recent mammogram was negative for any concern of malignancy.  Lab work reviewed with the patient stable.  We discussed plan of care and follow-up.    Review of Systems   Respiratory:  Negative for cough and shortness of breath.    Cardiovascular:  Negative for chest pain.   Gastrointestinal:  Negative for abdominal pain and diarrhea.   Genitourinary:  Negative for frequency.   Musculoskeletal:  Negative for back pain.   Skin:  Negative for rash.   Neurological:  Negative for headaches.   Psychiatric/Behavioral:  The patient is not nervous/anxious.    All other systems reviewed and are negative.    Physical: Limited     There were no vitals filed for this visit.    Lab Results   Component Value Date    WBC 4.20 (L) 06/05/2024    RBC 4.44 06/05/2024    HGB 12.6 06/05/2024    HCT 38.5 06/05/2024    MCV 86.7 06/05/2024    MCH 28.4 06/05/2024    MCHC 32.7 (L) 06/05/2024    RDW 14.1 06/05/2024     06/05/2024    MPV 9.7 06/05/2024     CMP  Sodium   Date Value Ref Range Status   06/05/2024 143 136 - 145 mmol/L Final     Potassium   Date Value  Ref Range Status   06/05/2024 4.1 3.5 - 5.1 mmol/L Final     Chloride   Date Value Ref Range Status   06/05/2024 105 98 - 107 mmol/L Final     CO2   Date Value Ref Range Status   06/05/2024 29 22 - 29 mmol/L Final     Blood Urea Nitrogen   Date Value Ref Range Status   06/05/2024 23.2 (H) 9.8 - 20.1 mg/dL Final     Creatinine   Date Value Ref Range Status   06/05/2024 0.88 0.55 - 1.02 mg/dL Final     Calcium   Date Value Ref Range Status   06/05/2024 10.0 8.4 - 10.2 mg/dL Final     Albumin   Date Value Ref Range Status   06/05/2024 4.0 3.5 - 5.0 g/dL Final     Bilirubin Total   Date Value Ref Range Status   06/05/2024 0.4 <=1.5 mg/dL Final     ALP   Date Value Ref Range Status   06/05/2024 58 40 - 150 unit/L Final     AST   Date Value Ref Range Status   06/05/2024 16 5 - 34 unit/L Final     ALT   Date Value Ref Range Status   06/05/2024 12 0 - 55 unit/L Final     eGFR   Date Value Ref Range Status   06/05/2024 >60 mL/min/1.73/m2 Final     Assessment :  1. Malignant neoplasm of upper-outer quadrant of left female breast C50.412 Stage IA [id number] Left Breast Carcinoma - ER 99%+, [place] 10%+ and Her2 -ve. Diagnosed 11/2015 1/11/16: mastectomy w/SLNBX x4  - Patient has tubular histology, which is lower risk than standard invasive ductal carcinoma.  8/27/18: Mammogram showed BIRADS Category 2: Benign. No mammographic evidence of malignancy  -4/20/2021: Right screening MMG with manfred: Right Breast: Negative (BI-RADS 1) Left Breast: Benign (BI-RADS 2)       Malignant neoplasm of upper-outer quadrant of left female breast  Per NCCN Guidelines for Breast Cancer Surveillance  -History and Physical exam 1-4 times per year as clinically appropriate for 5 years, then annually.  -Periodic screening for changes in family history and referral to genetic counseling as indicated.  -Educate, monitor, and refer for lymphedema management.  -Mammography every 12 months.  -Routine imaging of reconstructed breast is not indicated.  -In the  absence of clinical signs and symptoms suggestive of recurrent disease, there is not indication for laboratory or imaging studies for metastases screening.  -Women on Tamoxifen: annual gynecologic assessment every 12 months if uterus present.  -Women on an Aromatase Inhibitor or who experience ovarian failure secondary to treatment should have monitoring of bone health with a bone mineral density determination at baseline and periodically thereafter.  -Assess and encourage adherence to adjuvant endocrine therapy.  -Evidence suggests that active lifestyle, healthy diet, limited alcohol intake, and achieving and maintaining an ideal body weight (20-25 BMI) may lead to optimal breast cancer outcomes.     Plan 12/6/24:  -No evidence of disease recurrence.   -Continue surveillance.  -Annual screening MMG with manfred due September 2025; ordered today.  -Will order DEXA to be done Sept 2025  -Follow up w/NP in 12 months no lab work, with mammogram and DEXA  results.    Answers submitted by the patient for this visit:  Review of Systems Questionnaire (Submitted on 12/6/2024)  appetite change : No  unexpected weight change: No  mouth sores: No  visual disturbance: No  adenopathy: No

## 2024-12-31 ENCOUNTER — OFFICE VISIT (OUTPATIENT)
Dept: INTERNAL MEDICINE | Facility: CLINIC | Age: 59
End: 2024-12-31
Payer: MEDICAID

## 2024-12-31 VITALS
RESPIRATION RATE: 18 BRPM | BODY MASS INDEX: 28.34 KG/M2 | HEART RATE: 84 BPM | WEIGHT: 166 LBS | DIASTOLIC BLOOD PRESSURE: 89 MMHG | HEIGHT: 64 IN | TEMPERATURE: 98 F | OXYGEN SATURATION: 100 % | SYSTOLIC BLOOD PRESSURE: 139 MMHG

## 2024-12-31 DIAGNOSIS — I10 HYPERTENSION, UNSPECIFIED TYPE: ICD-10-CM

## 2024-12-31 DIAGNOSIS — Z85.3 HISTORY OF LEFT BREAST CANCER: ICD-10-CM

## 2024-12-31 DIAGNOSIS — I10 PRIMARY HYPERTENSION: Primary | ICD-10-CM

## 2024-12-31 DIAGNOSIS — Z00.00 HEALTHCARE MAINTENANCE: ICD-10-CM

## 2024-12-31 DIAGNOSIS — E78.5 HYPERLIPIDEMIA, UNSPECIFIED HYPERLIPIDEMIA TYPE: ICD-10-CM

## 2024-12-31 DIAGNOSIS — R73.03 PRE-DIABETES: ICD-10-CM

## 2024-12-31 PROCEDURE — 99213 OFFICE O/P EST LOW 20 MIN: CPT | Mod: PBBFAC | Performed by: STUDENT IN AN ORGANIZED HEALTH CARE EDUCATION/TRAINING PROGRAM

## 2024-12-31 RX ORDER — AMLODIPINE BESYLATE 10 MG/1
10 TABLET ORAL DAILY
Qty: 90 TABLET | Refills: 3 | Status: SHIPPED | OUTPATIENT
Start: 2024-12-31 | End: 2025-12-31

## 2024-12-31 RX ORDER — LISINOPRIL 20 MG/1
20 TABLET ORAL DAILY
Qty: 90 TABLET | Refills: 6 | Status: SHIPPED | OUTPATIENT
Start: 2024-12-31 | End: 2026-09-22

## 2024-12-31 NOTE — PROGRESS NOTES
"[facility] INTERNAL MEDICINE  OUTPATIENT OFFICE VISIT NOTE    SUBJECTIVE:      HPI: Lucille Pabon is a 56 y.o. yo female w/ PMH of HTN, and left breast carcinoma (diagnosed 1/20/2016) s/p mastectomy, who presents today for regular F/U. She has no acute complaints at this time and is compliant with home meds without any difficulty.     ROS:  (-) Chest pain, palpitations, SOB, fever, night sweats, chills, diarrhea, constipation.     OBJECTIVE:     Vital signs:   /89 (BP Location: Left arm, Patient Position: Sitting)   Pulse 84   Temp 98.1 °F (36.7 °C) (Oral)   Resp 18   Ht 5' 4" (1.626 m)   Wt 75.3 kg (166 lb)   SpO2 100%   BMI 28.49 kg/m²      Physical Examination:  General: Overweight w/o distress  HEENT: NC/AT; PERRL; nasal and oral mucosa moist and clear  Pulm: CTA bilaterally, normal work of breathing  CV: S1, S2 w/o murmurs or gallops; no edema noted  GI: Soft with normal bowel sounds in all quadrants, no masses on palpation  MSK: Full ROM of all extremities  Neuro: AAOx4; motor/sensory function intact  Psych: Cooperative; appropriate mood and affect    Significant findings:  4/13/2021 - Right Breast: Negative (BI-RADS 1); Left Breast: Benign (BI-RADS 2)   4/20/2021 - DXA Bone Density test shows measured bone mineral density remains within normal limits  4/22/2022 - Mammogram shows Right Breast: Negative (BI-RADS 1); Left Breast: Benign (BI-RADS 2)     ASSESSMENT & PLAN:     HTN   -Continue Lisinopril 20mg daily and Amlodipine 10mg daily     HLD  -Lipid panel 2/19/2024: , HDL 66, T. Cholesterol 197  -ASCVD score >7%  -Patient would like to pursue lifestyle modifications for now  -On-going discussion regarding statin therapy    Pre-diabetes  -A1c 5.7 (6/16/2021), will repeat A1c     HX of left breast carcinoma (ER+, HER2+)  -S/P mastectomy w/ sentinel node BX on 1/11/2016 showing 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  -Completed Tamoxifen therapy " 2/17/2017  -Currently being seen by [facility]; continue regular surveillance per Oncology's recommendation   -Dexa scan 3/14/2019 showed normal bone density     Health Maintenance:  -Seeing [facility] clinic  -Last colonoscopy 8/19/2016 WNL; repeat q10 years  -Mammogram 9/26/2024 negative  -Vaccines:     -Tdap (2/16/2017)    -Prevnar (2/20/2019)    -Flu shot (refuses)    -COVID vaccine #1 (3/22/2021); #2 (4/20/2021); #3 (11/12/2021)    -Shingrix #1 (5/3/2022) #2 (9/8/2022)     Return to clinic in 6 months. F/U CMP, CBC, lipid panel, A1c.    Joe Marti, DO   [facility], PGY-III